# Patient Record
Sex: MALE | Race: WHITE | NOT HISPANIC OR LATINO | Employment: OTHER | URBAN - METROPOLITAN AREA
[De-identification: names, ages, dates, MRNs, and addresses within clinical notes are randomized per-mention and may not be internally consistent; named-entity substitution may affect disease eponyms.]

---

## 2023-03-03 LAB
GRAM STAIN: ABNORMAL
TISSUE/WOUND CULTURE/SMEAR: ABNORMAL
TISSUE/WOUND CULTURE/SMEAR: ABNORMAL

## 2023-03-06 ENCOUNTER — TELEPHONE (OUTPATIENT)
Dept: PRIMARY CARE | Facility: CLINIC | Age: 88
End: 2023-03-06
Payer: MEDICARE

## 2023-03-06 NOTE — TELEPHONE ENCOUNTER
Pt called to ask you a question about the cyst you drain last week.     Says he still has a hard lump where the cyst was...is this normal? Do you need to see his again for a re check?     He said he is healing fine, no redness or drainage, also he took his last antibiotic today.

## 2023-04-06 ENCOUNTER — TELEMEDICINE (OUTPATIENT)
Dept: PRIMARY CARE | Facility: CLINIC | Age: 88
End: 2023-04-06
Payer: MEDICARE

## 2023-04-06 DIAGNOSIS — E08.00 DIABETES MELLITUS DUE TO UNDERLYING CONDITION WITH HYPEROSMOLARITY WITHOUT COMA, WITHOUT LONG-TERM CURRENT USE OF INSULIN (MULTI): ICD-10-CM

## 2023-04-06 DIAGNOSIS — I48.91 ATRIAL FIBRILLATION WITH RVR (MULTI): Primary | ICD-10-CM

## 2023-04-06 PROBLEM — E66.9 CLASS 1 OBESITY WITH BODY MASS INDEX (BMI) OF 31.0 TO 31.9 IN ADULT: Status: ACTIVE | Noted: 2023-04-06

## 2023-04-06 PROBLEM — E66.811 CLASS 1 OBESITY WITH BODY MASS INDEX (BMI) OF 31.0 TO 31.9 IN ADULT: Status: ACTIVE | Noted: 2023-04-06

## 2023-04-06 PROBLEM — R51.9 OCCIPITAL PAIN: Status: ACTIVE | Noted: 2023-04-06

## 2023-04-06 PROBLEM — E11.9 DIABETES MELLITUS TYPE 2, CONTROLLED, WITHOUT COMPLICATIONS (MULTI): Status: ACTIVE | Noted: 2023-04-06

## 2023-04-06 PROBLEM — L02.214 GROIN ABSCESS: Status: ACTIVE | Noted: 2023-04-06

## 2023-04-06 PROBLEM — M48.061 LUMBAR CANAL STENOSIS: Status: ACTIVE | Noted: 2023-04-06

## 2023-04-06 PROBLEM — M79.602 PAIN OF LEFT UPPER EXTREMITY: Status: ACTIVE | Noted: 2023-04-06

## 2023-04-06 PROBLEM — R51.9 PAIN, HEAD: Status: ACTIVE | Noted: 2023-04-06

## 2023-04-06 PROBLEM — M43.10 ACQUIRED SPONDYLOLISTHESIS: Status: ACTIVE | Noted: 2023-04-06

## 2023-04-06 PROBLEM — M79.601 PAIN OF RIGHT UPPER EXTREMITY: Status: ACTIVE | Noted: 2023-04-06

## 2023-04-06 PROBLEM — I25.10 CORONARY ARTERIOSCLEROSIS: Status: ACTIVE | Noted: 2023-04-06

## 2023-04-06 PROBLEM — I10 HYPERTENSION: Status: ACTIVE | Noted: 2023-04-06

## 2023-04-06 PROBLEM — E78.5 HYPERLIPIDEMIA: Status: ACTIVE | Noted: 2023-04-06

## 2023-04-06 PROCEDURE — 99442 PR PHYS/QHP TELEPHONE EVALUATION 11-20 MIN: CPT | Performed by: FAMILY MEDICINE

## 2023-04-06 RX ORDER — VALSARTAN 320 MG/1
TABLET ORAL
COMMUNITY
Start: 2016-06-28 | End: 2024-04-05 | Stop reason: WASHOUT

## 2023-04-06 RX ORDER — ATENOLOL 25 MG/1
TABLET ORAL DAILY
COMMUNITY

## 2023-04-06 RX ORDER — ASPIRIN 81 MG/1
81 TABLET ORAL ONCE
COMMUNITY

## 2023-04-06 RX ORDER — ACETAMINOPHEN 500 MG
TABLET ORAL
COMMUNITY
End: 2024-04-05 | Stop reason: WASHOUT

## 2023-04-06 RX ORDER — INSULIN GLARGINE 100 [IU]/ML
40 INJECTION, SOLUTION SUBCUTANEOUS
COMMUNITY
Start: 2017-09-12

## 2023-04-06 RX ORDER — GARLIC 1000 MG
CAPSULE ORAL
COMMUNITY

## 2023-04-06 RX ORDER — LORATADINE 10 MG/1
CAPSULE, LIQUID FILLED ORAL
COMMUNITY

## 2023-04-06 RX ORDER — MULTIVITAMIN
TABLET ORAL
COMMUNITY

## 2023-04-06 RX ORDER — ASCORBIC ACID 500 MG
TABLET ORAL
COMMUNITY
End: 2024-04-05 | Stop reason: WASHOUT

## 2023-04-06 RX ORDER — NITROGLYCERIN 0.4 MG/1
TABLET SUBLINGUAL
COMMUNITY

## 2023-04-06 RX ORDER — FLUOXETINE HYDROCHLORIDE 40 MG/1
CAPSULE ORAL
COMMUNITY
End: 2024-04-05 | Stop reason: WASHOUT

## 2023-04-06 RX ORDER — LATANOPROST 50 UG/ML
SOLUTION/ DROPS OPHTHALMIC
COMMUNITY
Start: 2022-05-23

## 2023-04-06 RX ORDER — ISOSORBIDE MONONITRATE 60 MG/1
1 TABLET, EXTENDED RELEASE ORAL EVERY MORNING
COMMUNITY
Start: 2022-04-07

## 2023-04-06 RX ORDER — TRIAMCINOLONE ACETONIDE 1 MG/G
CREAM TOPICAL
COMMUNITY
Start: 2022-05-03

## 2023-04-06 RX ORDER — GUAIFENESIN/PHENYLPROPANOLAMIN
EXPECTORANT ORAL
COMMUNITY

## 2023-04-06 RX ORDER — IRBESARTAN 300 MG/1
1 TABLET ORAL
COMMUNITY
Start: 2022-07-11

## 2023-04-06 RX ORDER — SPIRONOLACTONE AND HYDROCHLOROTHIAZIDE 25; 25 MG/1; MG/1
1 TABLET ORAL DAILY
COMMUNITY
Start: 2017-01-09

## 2023-04-06 RX ORDER — SPIRONOLACTONE 25 MG/1
1 TABLET ORAL
COMMUNITY
Start: 2022-04-06 | End: 2024-04-05 | Stop reason: WASHOUT

## 2023-04-06 RX ORDER — AMLODIPINE BESYLATE 10 MG/1
TABLET ORAL DAILY
COMMUNITY

## 2023-04-06 RX ORDER — OMEGA-3/DHA/EPA/FISH OIL 300-1000MG
CAPSULE,DELAYED RELEASE (ENTERIC COATED) ORAL
COMMUNITY
End: 2024-04-05 | Stop reason: WASHOUT

## 2023-04-06 RX ORDER — FLUTICASONE PROPIONATE 50 MCG
2 SPRAY, SUSPENSION (ML) NASAL EVERY MORNING
COMMUNITY
Start: 2022-03-17

## 2023-04-06 RX ORDER — ATORVASTATIN CALCIUM 40 MG/1
40 TABLET, FILM COATED ORAL DAILY
COMMUNITY

## 2023-04-06 RX ORDER — SULFAMETHOXAZOLE AND TRIMETHOPRIM 800; 160 MG/1; MG/1
1 TABLET ORAL 2 TIMES DAILY
COMMUNITY
Start: 2023-02-27 | End: 2024-04-05 | Stop reason: WASHOUT

## 2023-04-06 RX ORDER — INSULIN HUMAN 100 [IU]/ML
30 INJECTION, SUSPENSION SUBCUTANEOUS
COMMUNITY
End: 2024-04-05 | Stop reason: WASHOUT

## 2023-04-06 RX ORDER — PANTOPRAZOLE SODIUM 40 MG/1
40 TABLET, DELAYED RELEASE ORAL
COMMUNITY
End: 2024-02-13

## 2023-04-06 RX ORDER — FUROSEMIDE 40 MG/1
1 TABLET ORAL
COMMUNITY
Start: 2021-08-15

## 2023-04-06 RX ORDER — GABAPENTIN 600 MG/1
1 TABLET ORAL 3 TIMES DAILY
COMMUNITY
Start: 2022-07-11

## 2023-04-06 RX ORDER — METOPROLOL SUCCINATE 50 MG/1
1 TABLET, EXTENDED RELEASE ORAL
COMMUNITY
Start: 2022-03-05

## 2023-04-06 NOTE — PROGRESS NOTES
Subjective   William Mckinnon is a 87 y.o. male who presents for No chief complaint on file..    [facility]  Hospital follow up   [place] in [place]   Started on 3/28/23 with Afib RVR with HR of 140   Admitted and did a cardiovertion   Is feeling a little better     Started on amiodarone and rivaroxaban   Stopped another one, irbesartan     Checking BP at home 120/60  HR is 55-77    Follow up with cardiologist tomorrow. Dr Neil   Saw endocrinologist yesterday , Dr Mohamud  with A1c 7.1%   - started on ozempic over the victoza     ROS was completed and all systems are negative with the exception of what was noted in the the HPI.     Objective     There were no vitals taken for this visit.     Physical Exam    Assessment/Plan   Problem List Items Addressed This Visit    None  Visit Diagnoses       Atrial fibrillation with RVR (CMS/HCC)    -  Primary    Diabetes mellitus due to underlying condition with hyperosmolarity without coma, without long-term current use of insulin (CMS/HCC)              Continue follow up with your specialists   We will see you as recommended.          Marlin Romo DO, MSMed, ABOM  [address]   [address]   [place] [address]  Ph. [phone]  Fx. [phone]

## 2023-04-10 ENCOUNTER — TELEMEDICINE (OUTPATIENT)
Dept: PRIMARY CARE | Facility: CLINIC | Age: 88
End: 2023-04-10
Payer: MEDICARE

## 2023-04-10 ENCOUNTER — TELEPHONE (OUTPATIENT)
Dept: PRIMARY CARE | Facility: CLINIC | Age: 88
End: 2023-04-10

## 2023-04-10 DIAGNOSIS — J01.10 ACUTE FRONTAL SINUSITIS, RECURRENCE NOT SPECIFIED: Primary | ICD-10-CM

## 2023-04-10 PROCEDURE — 99214 OFFICE O/P EST MOD 30 MIN: CPT | Performed by: FAMILY MEDICINE

## 2023-04-10 RX ORDER — DOXYCYCLINE 100 MG/1
100 CAPSULE ORAL 2 TIMES DAILY
Qty: 14 CAPSULE | Refills: 0 | Status: SHIPPED | OUTPATIENT
Start: 2023-04-10 | End: 2023-04-17

## 2023-04-10 RX ORDER — BENZONATATE 100 MG/1
100 CAPSULE ORAL 3 TIMES DAILY PRN
Qty: 42 CAPSULE | Refills: 0 | Status: SHIPPED | OUTPATIENT
Start: 2023-04-10 | End: 2023-05-10

## 2023-04-10 NOTE — TELEPHONE ENCOUNTER
William Pratibha calling because he and his wife had a virtual on Friday with Dr Romo.  At that visit his wife was sick  not him.  Dr Romo put her on antibiotics and she is already feeling better. William is now sick with a low grade fever and cough and was hoping Dr Romo could prescribe same antibiotic for him.  He also wants to know if you received the fax regarding his hospital admission?

## 2023-04-10 NOTE — PROGRESS NOTES
Subjective   William Mckinnon is a 87 y.o. male who presents for Sinusitis.    HPI  URI   Started on Saturday with a scratch throat and facial/ head congestion   Had this before and then doxycycline has worked in the past   Low grade fever in  and out   Chilled   No chest pains   Coughing and sneezing   Taking vitamin C and theraflu    ROS was completed and all systems are negative with the exception of what was noted in the the HPI.     Objective  deferred     There were no vitals taken for this visit.     Physical Exam deferred     Assessment/Plan   Problem List Items Addressed This Visit    None  Visit Diagnoses       Acute frontal sinusitis, recurrence not specified    -  Primary        Start the doxycycline and continue the theraflu   Try the benzonatate for the cough        Marlin Romo DO, MSMed, ABOM  [address]   [address]   [place] [address]  Ph. [phone]  Fx. [phone]

## 2023-04-17 LAB
ALANINE AMINOTRANSFERASE (SGPT) (U/L) IN SER/PLAS: 21 U/L (ref 10–52)
ALBUMIN (G/DL) IN SER/PLAS: 4.5 G/DL (ref 3.4–5)
ALKALINE PHOSPHATASE (U/L) IN SER/PLAS: 37 U/L (ref 33–136)
ANION GAP IN SER/PLAS: 13 MMOL/L (ref 10–20)
ASPARTATE AMINOTRANSFERASE (SGOT) (U/L) IN SER/PLAS: 31 U/L (ref 9–39)
BASOPHILS (10*3/UL) IN BLOOD BY AUTOMATED COUNT: 0.07 X10E9/L (ref 0–0.1)
BASOPHILS/100 LEUKOCYTES IN BLOOD BY AUTOMATED COUNT: 0.7 % (ref 0–2)
BILIRUBIN TOTAL (MG/DL) IN SER/PLAS: 0.7 MG/DL (ref 0–1.2)
CALCIUM (MG/DL) IN SER/PLAS: 9.4 MG/DL (ref 8.6–10.3)
CARBON DIOXIDE, TOTAL (MMOL/L) IN SER/PLAS: 28 MMOL/L (ref 21–32)
CHLORIDE (MMOL/L) IN SER/PLAS: 100 MMOL/L (ref 98–107)
CREATININE (MG/DL) IN SER/PLAS: 1.07 MG/DL (ref 0.5–1.3)
EOSINOPHILS (10*3/UL) IN BLOOD BY AUTOMATED COUNT: 0.23 X10E9/L (ref 0–0.4)
EOSINOPHILS/100 LEUKOCYTES IN BLOOD BY AUTOMATED COUNT: 2.4 % (ref 0–6)
ERYTHROCYTE DISTRIBUTION WIDTH (RATIO) BY AUTOMATED COUNT: 13 % (ref 11.5–14.5)
ERYTHROCYTE MEAN CORPUSCULAR HEMOGLOBIN CONCENTRATION (G/DL) BY AUTOMATED: 32.6 G/DL (ref 32–36)
ERYTHROCYTE MEAN CORPUSCULAR VOLUME (FL) BY AUTOMATED COUNT: 93 FL (ref 80–100)
ERYTHROCYTES (10*6/UL) IN BLOOD BY AUTOMATED COUNT: 4.21 X10E12/L (ref 4.5–5.9)
GFR MALE: 67 ML/MIN/1.73M2
GLUCOSE (MG/DL) IN SER/PLAS: 128 MG/DL (ref 74–99)
HEMATOCRIT (%) IN BLOOD BY AUTOMATED COUNT: 39 % (ref 41–52)
HEMOGLOBIN (G/DL) IN BLOOD: 12.7 G/DL (ref 13.5–17.5)
IMMATURE GRANULOCYTES/100 LEUKOCYTES IN BLOOD BY AUTOMATED COUNT: 0.8 % (ref 0–0.9)
LEUKOCYTES (10*3/UL) IN BLOOD BY AUTOMATED COUNT: 9.6 X10E9/L (ref 4.4–11.3)
LYMPHOCYTES (10*3/UL) IN BLOOD BY AUTOMATED COUNT: 2.55 X10E9/L (ref 0.8–3)
LYMPHOCYTES/100 LEUKOCYTES IN BLOOD BY AUTOMATED COUNT: 26.5 % (ref 13–44)
MAGNESIUM (MG/DL) IN SER/PLAS: 1.57 MG/DL (ref 1.6–2.4)
MONOCYTES (10*3/UL) IN BLOOD BY AUTOMATED COUNT: 0.78 X10E9/L (ref 0.05–0.8)
MONOCYTES/100 LEUKOCYTES IN BLOOD BY AUTOMATED COUNT: 8.1 % (ref 2–10)
NEUTROPHILS (10*3/UL) IN BLOOD BY AUTOMATED COUNT: 5.91 X10E9/L (ref 1.6–5.5)
NEUTROPHILS/100 LEUKOCYTES IN BLOOD BY AUTOMATED COUNT: 61.5 % (ref 40–80)
PLATELETS (10*3/UL) IN BLOOD AUTOMATED COUNT: 225 X10E9/L (ref 150–450)
POTASSIUM (MMOL/L) IN SER/PLAS: 4 MMOL/L (ref 3.5–5.3)
PROTEIN TOTAL: 7.7 G/DL (ref 6.4–8.2)
SODIUM (MMOL/L) IN SER/PLAS: 137 MMOL/L (ref 136–145)
THYROTROPIN (MIU/L) IN SER/PLAS BY DETECTION LIMIT <= 0.05 MIU/L: 1.7 MIU/L (ref 0.44–3.98)
UREA NITROGEN (MG/DL) IN SER/PLAS: 21 MG/DL (ref 6–23)

## 2023-07-26 ENCOUNTER — TELEPHONE (OUTPATIENT)
Dept: PRIMARY CARE | Facility: CLINIC | Age: 88
End: 2023-07-26
Payer: MEDICARE

## 2023-07-26 NOTE — TELEPHONE ENCOUNTER
Pt hurt shoulder last Thursday and it is not any better. He went to urgent care and they would not do X-Ray's or anything and told him to make an appointment with his PCP or get a referral from his PCP for ortho.

## 2023-07-31 ENCOUNTER — TELEPHONE (OUTPATIENT)
Dept: PRIMARY CARE | Facility: CLINIC | Age: 88
End: 2023-07-31
Payer: MEDICARE

## 2023-07-31 NOTE — TELEPHONE ENCOUNTER
Patient called back to follow up on shoulder pain. States it was the right shoulder and he's not exactly sure how he hurt it, just that after doing yard work the very next day he's had a lot of pain. States he saw Dr. Mckoy, was told the pain was muscular and they gave him a Cortizone shot, but it didn't help. States he is still in a tremendous amount of pain.  Would like to know if there is anything else that can be done.

## 2023-08-01 ENCOUNTER — OFFICE VISIT (OUTPATIENT)
Dept: PRIMARY CARE | Facility: CLINIC | Age: 88
End: 2023-08-01
Payer: MEDICARE

## 2023-08-01 VITALS — HEART RATE: 73 BPM | DIASTOLIC BLOOD PRESSURE: 80 MMHG | SYSTOLIC BLOOD PRESSURE: 126 MMHG | OXYGEN SATURATION: 97 %

## 2023-08-01 DIAGNOSIS — S46.912D STRAIN OF LEFT SHOULDER, SUBSEQUENT ENCOUNTER: Primary | ICD-10-CM

## 2023-08-01 DIAGNOSIS — M25.511 ACUTE PAIN OF RIGHT SHOULDER: Primary | ICD-10-CM

## 2023-08-01 PROCEDURE — 3074F SYST BP LT 130 MM HG: CPT | Performed by: FAMILY MEDICINE

## 2023-08-01 PROCEDURE — 99214 OFFICE O/P EST MOD 30 MIN: CPT | Performed by: FAMILY MEDICINE

## 2023-08-01 PROCEDURE — 1159F MED LIST DOCD IN RCRD: CPT | Performed by: FAMILY MEDICINE

## 2023-08-01 PROCEDURE — 3079F DIAST BP 80-89 MM HG: CPT | Performed by: FAMILY MEDICINE

## 2023-08-01 PROCEDURE — 1036F TOBACCO NON-USER: CPT | Performed by: FAMILY MEDICINE

## 2023-08-01 RX ORDER — METHOCARBAMOL 500 MG/1
500 TABLET, FILM COATED ORAL 3 TIMES DAILY PRN
Qty: 40 TABLET | Refills: 0 | Status: SHIPPED | OUTPATIENT
Start: 2023-08-01 | End: 2023-08-14 | Stop reason: SDUPTHER

## 2023-08-01 ASSESSMENT — PATIENT HEALTH QUESTIONNAIRE - PHQ9
1. LITTLE INTEREST OR PLEASURE IN DOING THINGS: NOT AT ALL
SUM OF ALL RESPONSES TO PHQ9 QUESTIONS 1 AND 2: 0
2. FEELING DOWN, DEPRESSED OR HOPELESS: NOT AT ALL

## 2023-08-01 NOTE — PROGRESS NOTES
Subjective   William Mckinnon is a 88 y.o. male who presents for Follow-up (Right shoulder pain x 2 weeks starting to get worse. Had xrays in February/Urgent care recent recommended PCP and Orthro).    HPI    #) right shoulder pain   - OA and xray in Feb  - but since then has had a fall on 7/7/23   - saw EDMOND Mckeon,  was told the pain was muscular and they gave him a Cortizone shot, but it didn't help.   - will also try robaxin   - and an order for PT     #) T2DM   - on insulin   - has a Junior   - last A1c 6.2%     #) Afib   [facility] in [place]   Started on 3/28/23 with Afib RVR with HR of 140   Admitted and did a cardiovertion   Is feeling a little better   Started on amiodarone and rivaroxaban   Stopped another one, irbesartan   Checking BP at home 120/60  HR is 55-77  Follow up with cardiologist , Dr Neil   Saw endocrinologist yesterday , Dr Mohamud  with A1c 7.1%   - started on ozempic over the victoza     ROS was completed and all systems are negative with the exception of what was noted in the the HPI.     Objective     /80   Pulse 73   SpO2 97%      Physical Exam  GEN: A+O, no acute distress  HEENT: NC/AT, Oropharynx clear, no exudates, TM visualized, Extraoccular muscles intact, no facial droop; no thyromegaly or cervical LAD  RESP: CTAB, no wheezes   CV: RRR, no murmurs  ABD: soft, non-tender, + BS  SKIN: no rashes or bruising, no peripheral edema   NEURO: CN II-XII grossly intact, moves all extremities equally, no tremor   PSYCH: normal affect, appropriate mood     Assessment/Plan     Please get xrays of the right shoulder  - we will call you with the results.   Follow up with Dr Mckoy   Also try the robaxin for muscle spasms  Also get assessment for physical therapy on the right shoulder.     Follow up as recommended          Marlin Romo, DO, MSMed, ABOM  [address]   [address]   [place] [address]  Ph. [phone]  Fx. [phone]

## 2023-08-01 NOTE — TELEPHONE ENCOUNTER
Spoke with patient's wife. States they have tried heat and cold, is taking extra strength tylenol but doesn't have any muscle relaxer's

## 2023-08-01 NOTE — PATIENT INSTRUCTIONS
Please get xrays of the right shoulder  - we will call you with the results.   Follow up with Dr Mckoy   Also try the robaxin for muscle spasms  Also get assessment for physical therapy on the right shoulder.     Follow up as recommended

## 2023-08-14 DIAGNOSIS — S46.912D STRAIN OF LEFT SHOULDER, SUBSEQUENT ENCOUNTER: ICD-10-CM

## 2023-08-15 RX ORDER — METHOCARBAMOL 500 MG/1
500 TABLET, FILM COATED ORAL 3 TIMES DAILY PRN
Qty: 40 TABLET | Refills: 1 | Status: SHIPPED | OUTPATIENT
Start: 2023-08-15 | End: 2023-09-18 | Stop reason: SDUPTHER

## 2023-08-18 ENCOUNTER — OFFICE VISIT (OUTPATIENT)
Dept: PRIMARY CARE | Facility: CLINIC | Age: 88
End: 2023-08-18
Payer: MEDICARE

## 2023-08-18 VITALS
BODY MASS INDEX: 31.38 KG/M2 | SYSTOLIC BLOOD PRESSURE: 138 MMHG | OXYGEN SATURATION: 96 % | HEART RATE: 78 BPM | WEIGHT: 225 LBS | DIASTOLIC BLOOD PRESSURE: 82 MMHG

## 2023-08-18 DIAGNOSIS — E08.00 DIABETES MELLITUS DUE TO UNDERLYING CONDITION WITH HYPEROSMOLARITY WITHOUT COMA, WITHOUT LONG-TERM CURRENT USE OF INSULIN (MULTI): ICD-10-CM

## 2023-08-18 DIAGNOSIS — R91.1 RIGHT LOWER LOBE PULMONARY NODULE: ICD-10-CM

## 2023-08-18 DIAGNOSIS — E78.2 MIXED HYPERLIPIDEMIA: ICD-10-CM

## 2023-08-18 DIAGNOSIS — M25.511 ACUTE PAIN OF RIGHT SHOULDER: ICD-10-CM

## 2023-08-18 DIAGNOSIS — Z00.00 ROUTINE GENERAL MEDICAL EXAMINATION AT HEALTH CARE FACILITY: Primary | ICD-10-CM

## 2023-08-18 LAB — HEMOGLOBIN A1C/HEMOGLOBIN TOTAL IN BLOOD EXTERNAL: 6.7 %

## 2023-08-18 PROCEDURE — 1159F MED LIST DOCD IN RCRD: CPT | Performed by: FAMILY MEDICINE

## 2023-08-18 PROCEDURE — 99214 OFFICE O/P EST MOD 30 MIN: CPT | Performed by: FAMILY MEDICINE

## 2023-08-18 PROCEDURE — 1160F RVW MEDS BY RX/DR IN RCRD: CPT | Performed by: FAMILY MEDICINE

## 2023-08-18 PROCEDURE — 1036F TOBACCO NON-USER: CPT | Performed by: FAMILY MEDICINE

## 2023-08-18 PROCEDURE — 3075F SYST BP GE 130 - 139MM HG: CPT | Performed by: FAMILY MEDICINE

## 2023-08-18 PROCEDURE — 1170F FXNL STATUS ASSESSED: CPT | Performed by: FAMILY MEDICINE

## 2023-08-18 PROCEDURE — 3079F DIAST BP 80-89 MM HG: CPT | Performed by: FAMILY MEDICINE

## 2023-08-18 RX ORDER — AMIODARONE HYDROCHLORIDE 200 MG/1
200 TABLET ORAL DAILY
COMMUNITY

## 2023-08-18 ASSESSMENT — ACTIVITIES OF DAILY LIVING (ADL)
BATHING: INDEPENDENT
TAKING_MEDICATION: INDEPENDENT
DRESSING: INDEPENDENT
DOING_HOUSEWORK: INDEPENDENT
GROCERY_SHOPPING: INDEPENDENT
MANAGING_FINANCES: INDEPENDENT

## 2023-08-18 NOTE — PATIENT INSTRUCTIONS
Jake will need to be [facility] [place] as they are the most up to date as to what immunizations, medications should be included when embarking on such a trip.  Please let him know this.  Either set up referral or give him the # for the clinic. missy   Please continue PT for the right shoulder ,    So glad you are feeling better after the bee stings.     Please get an updated CT chest after August 30, 2023, order is in.     Follow up with Dr Bowman for the diabetes, A1c was good last time at 6.7%.     Magnesium was low in April 2023. Please increase the magnesium in the diet ( potatoes, tomatoes, bananas, etc. )     [facility] - stable blood counts and kidney function, your triglycerides are still elevated, contienu the choelsterol medications and fish oils.     Continue with regular eye and dental examinations.     Follow up in 4 months or sooner as needed

## 2023-08-18 NOTE — PROGRESS NOTES
Subjective   Reason for Visit: William Mckinnon is an 88 y.o. male here for a Medicare Wellness visit.     Reviewed all medications by prescribing practitioner or clinical pharmacist (such as prescriptions, OTCs, herbal therapies and supplements) and documented in the medical record.    HPI    8/5/23 was sting by a bunch of bees   - healing now   - started on benadryl right away   - no need for steroids or epi    #) right shoulder pain - very good, doing much better with PT with Luc   - OA and xray in Feb  - pain down to 4-5/10   - but since then has had a fall on 7/7/23   - saw EDMOND Mckeon,  was told the pain was muscular and they gave him a Cortizone shot, but it didn't help.   - will also try robaxin   - and an order for PT     #) T2DM - controlled   - on insulin   - has a Junior   - last A1c 6.2% --> 6.7%   - follows with Dr Mohamud     #) Afib - stable   [facility] in [place]   Started on 3/28/23 with Afib RVR with HR of 140   Admitted and did a cardiovertion   Is feeling a little better   Is on amiodarone and rivaroxaban   Stopped another one, irbesartan   Checking BP at home 120/60  HR is 55-77  Follow up with cardiologist , Dr Neil   Saw endocrinologist yesterday , Dr Mohamud  with A1c 7.1%   - started on ozempic over the vicza     Patient Care Team:  Marlin Romo DO as PCP - General  Marlin Romo DO as PCP - [facility] Attributed Provider  Marlin Romo DO     Review of Systems  ROS was completed and all systems are negative with the exception of what was noted in the the HPI.     Objective   Vitals:  /82   Pulse 78   Wt 102 kg (225 lb)   SpO2 96%   BMI 31.38 kg/m²       Physical Exam  GEN: A+O, no acute distress  HEENT: NC/AT, Oropharynx clear, no exudates, TM visualized, Extraoccular muscles intact, no facial droop; no thyromegaly or cervical LAD  RESP: CTAB, no wheezes   CV: RRR, no murmurs  ABD: soft, non-tender, + BS  SKIN: no rashes or bruising, no peripheral edema   NEURO: CN II-XII  grossly intact, moves all extremities equally, no tremor   PSYCH: normal affect, appropriate mood     Assessment/Plan   Problem List Items Addressed This Visit    None  Visit Diagnoses       Right lower lobe pulmonary nodule    -  Primary    Relevant Orders    CT chest wo IV contrast    Routine general medical examination at health care facility              Please continue PT for the right shoulder ,    So glad you are feeling better after the bee stings.     Please get an updated CT chest after August 30, 2023, order is in.     Follow up with Dr Bowman for the diabetes, A1c was good last time at 6.7%.     Magnesium was low in April 2023. Please increase the magnesium in the diet ( potatoes, tomatoes, bananas, etc. )     [facility] - stable blood counts and kidney function, your triglycerides are still elevated, contienu the choelsterol medications and fish oils.     Continue with regular eye and dental examinations.     Follow up in 4 months or sooner as needed

## 2023-08-20 PROBLEM — I21.9 HEART ATTACK (MULTI): Status: ACTIVE | Noted: 2023-08-20

## 2023-08-20 PROBLEM — N28.9 KIDNEY LESION: Status: ACTIVE | Noted: 2018-05-10

## 2023-08-20 PROBLEM — S49.91XA INJURY OF SHOULDER, RIGHT: Status: ACTIVE | Noted: 2023-08-20

## 2023-08-20 PROBLEM — I25.10 ATHEROSCLEROSIS OF NATIVE CORONARY ARTERY OF NATIVE HEART WITHOUT ANGINA PECTORIS: Status: ACTIVE | Noted: 2023-08-20

## 2023-08-20 PROBLEM — T63.441A STING, BEE: Status: ACTIVE | Noted: 2023-08-20

## 2023-08-20 PROBLEM — D51.3 OTHER DIETARY VITAMIN B12 DEFICIENCY ANEMIA: Status: ACTIVE | Noted: 2018-06-07

## 2023-08-20 PROBLEM — D50.9 IRON DEFICIENCY ANEMIA: Status: ACTIVE | Noted: 2019-08-01

## 2023-08-20 PROBLEM — M43.16 SPONDYLOLISTHESIS, LUMBAR REGION: Status: ACTIVE | Noted: 2020-02-28

## 2023-08-20 PROBLEM — M48.062 LUMBAR STENOSIS WITH NEUROGENIC CLAUDICATION: Status: ACTIVE | Noted: 2019-08-30

## 2023-08-20 PROBLEM — R91.1 LUNG NODULE SEEN ON IMAGING STUDY: Status: ACTIVE | Noted: 2018-05-10

## 2023-08-20 RX ORDER — OMEGA-3 FATTY ACIDS 1000 MG
5000 CAPSULE ORAL DAILY
COMMUNITY

## 2023-08-20 RX ORDER — LANOLIN ALCOHOL/MO/W.PET/CERES
CREAM (GRAM) TOPICAL
COMMUNITY

## 2023-08-20 RX ORDER — FLUOCINONIDE 0.5 MG/G
1 CREAM TOPICAL DAILY PRN
COMMUNITY
Start: 2023-05-08

## 2023-08-20 RX ORDER — SAW PALMETTO 160 MG
CAPSULE ORAL
COMMUNITY
End: 2024-04-05 | Stop reason: WASHOUT

## 2023-08-20 RX ORDER — INSULIN ASPART 100 [IU]/ML
30 INJECTION, SOLUTION INTRAVENOUS; SUBCUTANEOUS
COMMUNITY

## 2023-08-20 RX ORDER — CLOPIDOGREL BISULFATE 75 MG/1
TABLET ORAL
COMMUNITY
Start: 2017-11-13

## 2023-08-20 RX ORDER — FERROUS SULFATE 325(65) MG
325 TABLET ORAL 2 TIMES DAILY
COMMUNITY
End: 2024-04-05 | Stop reason: WASHOUT

## 2023-08-20 RX ORDER — MOMETASONE FUROATE 50 UG/1
1 SPRAY, METERED NASAL
COMMUNITY

## 2023-08-20 RX ORDER — TRAMADOL HYDROCHLORIDE 50 MG/1
50 TABLET ORAL EVERY 6 HOURS
COMMUNITY
Start: 2023-07-25

## 2023-08-20 RX ORDER — SPIRONOLACTONE 50 MG/1
50 TABLET, FILM COATED ORAL DAILY
COMMUNITY
End: 2024-04-05 | Stop reason: WASHOUT

## 2023-08-20 RX ORDER — TERBINAFINE HYDROCHLORIDE 250 MG/1
1 TABLET ORAL DAILY
COMMUNITY
Start: 2020-12-14

## 2023-08-20 RX ORDER — FLUOXETINE HYDROCHLORIDE 20 MG/1
20 CAPSULE ORAL DAILY
COMMUNITY

## 2023-08-20 RX ORDER — INSULIN LISPRO 100 [IU]/ML
1 INJECTION, SOLUTION INTRAVENOUS; SUBCUTANEOUS 3 TIMES DAILY
COMMUNITY
End: 2024-04-05 | Stop reason: WASHOUT

## 2023-08-20 RX ORDER — FERROUS SULFATE 325(65) MG
65 TABLET, DELAYED RELEASE (ENTERIC COATED) ORAL DAILY
COMMUNITY
End: 2024-04-05 | Stop reason: WASHOUT

## 2023-08-20 RX ORDER — IBUPROFEN 100 MG/5ML
SUSPENSION, ORAL (FINAL DOSE FORM) ORAL
COMMUNITY
End: 2024-04-05 | Stop reason: WASHOUT

## 2023-08-20 RX ORDER — OMEPRAZOLE 20 MG/1
20 CAPSULE, DELAYED RELEASE ORAL
COMMUNITY

## 2023-08-20 RX ORDER — LOSARTAN POTASSIUM 50 MG/1
TABLET ORAL
COMMUNITY
Start: 2023-08-09

## 2023-08-20 RX ORDER — MAGNESIUM OXIDE 240 MG
POWDER IN PACKET (EA) ORAL
COMMUNITY

## 2023-08-20 RX ORDER — HYDROCODONE BITARTRATE AND ACETAMINOPHEN 7.5; 325 MG/1; MG/1
1 TABLET ORAL EVERY 4 HOURS PRN
COMMUNITY
Start: 2020-07-10 | End: 2024-04-05 | Stop reason: WASHOUT

## 2023-08-20 RX ORDER — DICLOFENAC SODIUM 75 MG/1
TABLET, DELAYED RELEASE ORAL
COMMUNITY
Start: 2017-04-27 | End: 2024-04-05 | Stop reason: WASHOUT

## 2023-08-20 RX ORDER — DIPHENHYDRAMINE HCL 25 MG
50 CAPSULE ORAL
COMMUNITY
Start: 2019-03-04 | End: 2024-04-05 | Stop reason: WASHOUT

## 2023-08-20 RX ORDER — BRIMONIDINE TARTRATE AND TIMOLOL MALEATE 2; 5 MG/ML; MG/ML
SOLUTION OPHTHALMIC
COMMUNITY
Start: 2020-02-09

## 2023-08-20 RX ORDER — METFORMIN HYDROCHLORIDE 1000 MG/1
1000 TABLET ORAL
COMMUNITY
End: 2024-04-05 | Stop reason: WASHOUT

## 2023-08-20 RX ORDER — METHOCARBAMOL 750 MG/1
750 TABLET, FILM COATED ORAL EVERY 8 HOURS PRN
COMMUNITY
Start: 2021-11-22 | End: 2024-04-05 | Stop reason: WASHOUT

## 2023-08-20 RX ORDER — INSULIN LISPRO 100 [IU]/ML
INJECTION, SOLUTION INTRAVENOUS; SUBCUTANEOUS
COMMUNITY
Start: 2018-06-25 | End: 2024-04-05 | Stop reason: WASHOUT

## 2023-08-20 RX ORDER — HYDROCODONE BITARTRATE AND ACETAMINOPHEN 7.5; 325 MG/1; MG/1
1 TABLET ORAL EVERY 6 HOURS
COMMUNITY
Start: 2018-02-04 | End: 2024-04-05 | Stop reason: WASHOUT

## 2023-08-20 RX ORDER — CYANOCOBALAMIN 1000 UG/ML
1000 INJECTION, SOLUTION INTRAMUSCULAR; SUBCUTANEOUS
COMMUNITY

## 2023-08-20 RX ORDER — FLASH GLUCOSE SENSOR
KIT MISCELLANEOUS
COMMUNITY
Start: 2023-03-14

## 2023-08-20 RX ORDER — AMLODIPINE BESYLATE 5 MG/1
10 TABLET ORAL
COMMUNITY
Start: 2014-01-21 | End: 2024-04-05 | Stop reason: WASHOUT

## 2023-08-20 RX ORDER — FUROSEMIDE 20 MG/1
20 TABLET ORAL
COMMUNITY
End: 2024-02-23 | Stop reason: WASHOUT

## 2023-08-20 RX ORDER — SPIRONOLACTONE 25 MG/5ML
25 SUSPENSION ORAL
COMMUNITY
End: 2024-04-05 | Stop reason: WASHOUT

## 2023-08-20 RX ORDER — GARLIC 580 MG
CAPSULE ORAL DAILY
COMMUNITY
End: 2024-04-05 | Stop reason: WASHOUT

## 2023-08-20 RX ORDER — GLIMEPIRIDE 4 MG/1
4 TABLET ORAL DAILY
COMMUNITY
End: 2024-04-05 | Stop reason: WASHOUT

## 2023-08-20 RX ORDER — VALSARTAN 160 MG/1
320 TABLET ORAL
COMMUNITY
Start: 2013-10-29 | End: 2024-04-05 | Stop reason: WASHOUT

## 2023-09-18 DIAGNOSIS — S46.912D STRAIN OF LEFT SHOULDER, SUBSEQUENT ENCOUNTER: ICD-10-CM

## 2023-09-18 RX ORDER — METHOCARBAMOL 500 MG/1
500 TABLET, FILM COATED ORAL 3 TIMES DAILY PRN
Qty: 40 TABLET | Refills: 1 | Status: SHIPPED | OUTPATIENT
Start: 2023-09-18 | End: 2023-11-03

## 2023-10-03 NOTE — PROGRESS NOTES
89 yo of Dr. Munguia practice with Diabetes 2 (neuropathy/retinopathy), HTN, Dyslipidemia, karli presents for followup. A1C today 7.3%. Pt is testing sugars 2 times per day. Pt is having low sugars 0 times/week. Pt's typical blood sugars are running under 140 AM under 200 before bed. Pt is following a carb controlled diet and knows reasonable carb allowances. Pt is able to afford their medications. Pt is exercising.         Taking lantus 40 (45 with cortisone) units, novolog 30 units, isf-30 over 150, victoza (wasn't able to get ozempic).          90 day surjit 2 data: 65% in range, 1% low, pattern: mid 100's overnight, mid/upper 100's through the day.  -STATIN Atorvastatin tolerating LDL          -on several bp/cv meds, see below         -taking amiodarone since spring 2023, should have tsh q 6 months  -[facility]    Review of Systems:  Cardiology: Lightheadedness-denies.  Chest pain-denies.  Leg edema-denies.  Palpitations-denies.  Respiratory: Cough-denies. Shortness of breath-denies.  Wheezing-denies.  Gastroenterology: Constipation-denies.  Diarrhea-denies.  Heartburn-denies.  Endocrinology: Cold intolerance-denies.  Heat intolerance-denies.  Sweats-denies.  Neurology: Headache-denies.  Tremor-denies.  Neuropathy in extremities-denies.  Psychology: Low energy-denies.  Irritability-denies.  Sleep disturbances-denies.      Physical Exam:  General Appearance: pleasant, cooperative, no acute distress  HEENT: no chemosis, no proptosis, no lid lag, no lid retraction  Neck: no lymphadenopathy, no thyromegaly, no dominant thyroid nodules  Heart: no murmurs, irregular rhythm, rate controlled, S1 and S2  Lungs: no wheezes, no rhonci, no rales  Extremities: no lower extremity swelling      Current Outpatient Medications:     acetaminophen (Tylenol) 500 mg tablet, Tylenol Extra Strength 500 MG TABS  Refills: 0     Active, Disp: , Rfl:     amLODIPine (Norvasc) 10 mg tablet, Take by mouth once daily., Disp:  , Rfl:     ascorbic acid (Vitamin C) 500 mg tablet, Vitamin C 500 MG Oral Tablet  Refills: 0     Active, Disp: , Rfl:     aspirin 81 mg EC tablet, 1 tablet (81 mg) 1 time., Disp: , Rfl:     atenolol (Tenormin) 25 mg tablet, Take by mouth once daily., Disp: , Rfl:     atorvastatin (Lipitor) 40 mg tablet, Take 1 tablet (40 mg) by mouth once daily., Disp: , Rfl:     blood sugar diagnostic strip, Per endo, Disp: , Rfl:     ferrous sulfate 325 (65 Fe) MG EC tablet, Take 1 tablet (65 mg) by mouth once daily., Disp: , Rfl:     ferrous sulfate 325 (65 Fe) MG tablet, Take 1 tablet (325 mg) by mouth twice a day., Disp: , Rfl:     fish oil concentrate (Omega-3) 120-180 mg capsule, Take 1 capsule (1 g) by mouth., Disp: , Rfl:     fluocinonide 0.05 % cream, Apply 1 Application topically once daily as needed for rash (itch)., Disp: , Rfl:     FLUoxetine (PROzac) 40 mg capsule, PROzac 40 MG Oral Capsule  Refills: 0     Active, Disp: , Rfl:     fluticasone (Flonase) 50 mcg/actuation nasal spray, Administer 2 sprays into each nostril once daily in the morning., Disp: , Rfl:     FreeStyle Junior 2 Sensor kit, USE 1 SENSOR AS DIRECTED EVERY 14 DAYS, E11.65 ON INSULIN, Disp: , Rfl:     furosemide (Lasix) 40 mg tablet, Take 1 tablet (40 mg) by mouth once every day., Disp: , Rfl:     gabapentin (Neurontin) 600 mg tablet, Take 1 tablet (600 mg) by mouth 3 times a day., Disp: , Rfl:     garlic 1,000 mg capsule, Garlic 1000 MG Oral Capsule  Refills: 0     Active, Disp: , Rfl:     insulin aspart (NovoLOG FlexPen U-100 Insulin) 100 unit/mL (3 mL) pen, Inject 30 Units under the skin 3 times a day before meals., Disp: , Rfl:     insulin glargine (Basaglar KwikPen U-100 Insulin) 100 unit/mL (3 mL) pen, 40 Units., Disp: , Rfl:     insulin lispro (HumaLOG KwikPen Insulin) 100 unit/mL injection, , Disp: , Rfl:     insulin lispro (HumaLOG) 100 unit/mL injection, Inject 1 Dose under the skin 3 times a day. Reports he takes 10 units breakfast, 12 units  with luch and 16 units with dinner, Disp: , Rfl:     irbesartan (Avapro) 300 mg tablet, Take 1 tablet (300 mg) by mouth once every day., Disp: , Rfl:     isosorbide mononitrate ER (Imdur) 60 mg 24 hr tablet, Take 1 tablet (60 mg) by mouth once daily in the morning., Disp: , Rfl:     latanoprost (Xalatan) 0.005 % ophthalmic solution, Latanoprost 0.005 % Ophthalmic Solution  Quantity: 8  Refills: 0      Start : 23-May-2022  Active, Disp: , Rfl:     liraglutide (Victoza) 0.6 mg/0.1 mL (18 mg/3 mL) injection, Inject 0.3 mL (1.8 mg) under the skin once daily., Disp: , Rfl:     loratadine (Claritin Liqui-Gel) 10 mg capsule, Claritin 10 MG Oral Capsule  Refills: 0     Active, Disp: , Rfl:     losartan (Cozaar) 50 mg tablet, , Disp: , Rfl:     methocarbamol (Robaxin) 500 mg tablet, Take 1 tablet (500 mg) by mouth 3 times a day as needed for muscle spasms., Disp: 40 tablet, Rfl: 1    metoprolol succinate XL (Toprol-XL) 50 mg 24 hr tablet, Take 1 tablet (50 mg) by mouth once every day., Disp: , Rfl:     mometasone (Nasonex) 50 mcg/actuation nasal spray, Administer 1 spray into affected nostril(s)., Disp: , Rfl:     multivitamin tablet, Multivitamins TABS  Refills: 0     Active, Disp: , Rfl:     nitroglycerin (Nitrostat) 0.4 mg SL tablet, Place 1 tablet under the tongue every 5 minutes for up to 3 doses as needed for chest pain. Call 911 if pain persists, Disp: , Rfl:     omega-3 1,000 mg capsule capsule, Take 5 capsules (5,000 mg) by mouth once daily., Disp: , Rfl:     omega-3 fatty acids-fish oil 440-880 mg capsule, Omega-3 Fish Oil Ex St CAPS  Refills: 0     Active, Disp: , Rfl:     oxygen (O2) therapy, Inhale 2 L/min at 120,000 mL/hr. AT NIGHT.  OXYGEN LIQUID, Disp: , Rfl:     pantoprazole (Protonix) 40 mg EC tablet, 1 tablet (40 mg) once daily in the morning. Take before meals., Disp: , Rfl:     rivaroxaban (Xarelto) 20 mg tablet, 1 tablet (20 mg) once daily at bedtime., Disp: , Rfl:     saw palmetto 500 mg capsule, Saw  Palmetto 500 MG Oral Capsule  Refills: 0     Active, Disp: , Rfl:     spironolacton-hydrochlorothiaz (Aldactazide) 25-25 mg tablet, Take 1 tablet (25 mg) by mouth once daily., Disp: , Rfl:     spironolactone (Aldactone) 25 mg tablet, Take 1 tablet (25 mg) by mouth once every day., Disp: , Rfl:     sulfamethoxazole-trimethoprim (Bactrim DS) 800-160 mg tablet, Take 1 tablet by mouth 2 times a day., Disp: , Rfl:     triamcinolone (Kenalog) 0.1 % cream, APPLY TO AFFECTED AREA ONCE DAILY AS NEEDED FOR RASH/ITCHING *30DS* *GIVE MD OFFICE A CALL*, Disp: , Rfl:     valsartan (Diovan) 320 mg tablet, Valsartan 320 MG Oral Tablet  Quantity: 90  Refills: 0      Start : 28-Jun-2016  Active, Disp: , Rfl:     amiodarone (Pacerone) 200 mg tablet, Take 1 tablet (200 mg) by mouth once daily., Disp: , Rfl:     amLODIPine (Norvasc) 5 mg tablet, 2 tablets (10 mg)., Disp: , Rfl:     ascorbic acid (Vitamin C) 1,000 mg tablet, Take by mouth., Disp: , Rfl:     brimonidine-timoloL (Combigan) 0.2-0.5 % ophthalmic solution, , Disp: , Rfl:     clopidogrel (Plavix) 75 mg tablet, , Disp: , Rfl:     cyanocobalamin (Vitamin B-12) 1,000 mcg/mL injection, Inject 1 mL (1,000 mcg) into the shoulder, thigh, or buttocks every 30 (thirty) days., Disp: , Rfl:     diclofenac (Voltaren) 75 mg EC tablet, , Disp: , Rfl:     diphenhydrAMINE (BENADryl) 25 mg capsule, Take 2 capsules (50 mg) by mouth. Take 1 hour before proceedure, Disp: , Rfl:     FLUoxetine (PROzac) 20 mg capsule, Take 1 capsule (20 mg) by mouth once daily., Disp: , Rfl:     furosemide (Lasix) 20 mg tablet, Take 1 tablet (20 mg) by mouth once daily., Disp: , Rfl:     garlic 580 mg capsule, Take by mouth once daily. As directed, Disp: , Rfl:     glimepiride (Amaryl) 4 mg tablet, Take 1 tablet (4 mg) by mouth once daily. With breakfast, Disp: , Rfl:     HYDROcodone-acetaminophen (Norco) 7.5-325 mg tablet, Take 1 tablet by mouth every 6 hours., Disp: , Rfl:     HYDROcodone-acetaminophen (Norco)  7.5-325 mg tablet, Take 1 tablet by mouth every 4 hours if needed for moderate pain (4 - 6). MAX DAILY AMOUNT 6 TABLETS, Disp: , Rfl:     insulin NPH, Isophane, (HumuLIN N NPH U-100 Insulin) 100 unit/mL injection, 30 Units 2 times a day before meals., Disp: , Rfl:     magnesium oxide (Mag-Ox) 400 mg (241.3 mg magnesium) tablet, Take by mouth., Disp: , Rfl:     magnesium oxide 240 mg magnesium powder in packet, Take by mouth., Disp: , Rfl:     meloxicam (Mobic) 7.5 mg tablet, Take 1 tablet (7.5 mg) by mouth once daily., Disp: , Rfl:     metFORMIN (Glucophage) 1,000 mg tablet, Take 1 tablet (1,000 mg) by mouth 2 times a day with meals., Disp: , Rfl:     methocarbamol (Robaxin) 750 mg tablet, Take 1 tablet (750 mg) by mouth every 8 hours if needed for muscle spasms., Disp: , Rfl:     NON FORMULARY, VELTRIX, Disp: , Rfl:     omeprazole (PriLOSEC) 20 mg DR capsule, Take 1 capsule (20 mg) by mouth once daily., Disp: , Rfl:     saw palmetto 160 mg capsule, Take by mouth., Disp: , Rfl:     SITagliptin phosphate (Januvia) 100 mg tablet, Take 1 tablet (100 mg) by mouth once daily., Disp: , Rfl:     spironolactone (Aldactone) 25 mg/5 mL suspension, Take 5 mL (25 mg) by mouth once daily., Disp: , Rfl:     spironolactone (Aldactone) 50 mg tablet, Take 1 tablet (50 mg) by mouth once daily., Disp: , Rfl:     terbinafine (LamISIL) 250 mg tablet, Take 1 tablet (250 mg) by mouth once daily., Disp: , Rfl:     traMADol (Ultram) 50 mg tablet, Take 1 tablet (50 mg) by mouth every 6 hours., Disp: , Rfl:     valsartan (Diovan) 160 mg tablet, 2 tablets (320 mg)., Disp: , Rfl:     vit B12/intrinsic fact/folate (INTRINSI B12-FOLATE ORAL), B12 Folate, Disp: , Rfl:        1. Diabetic polyneuropathy associated with type 2 diabetes mellitus (CMS/HCC)  -BS elevated recently due to cortisone injections, he misplaced his coverage scale it was reprinted  -reviewed target bs waking, post prandial and bedtime  -reviewed allowable carbs per  meal/snacks  -no dose changes with insulin therapy    - POCT glycosylated hemoglobin (Hb A1C) manually resulted    2. Atrial fibrillation, unspecified type (CMS/HCC)  -stable  -managed by cardiology    3. Mixed hyperlipidemia  -LDL target < 70  -tolerates statin    4. Primary hypertension  -stable     FOLLOW UP WITH ME IN 6 MONTHS

## 2023-10-04 ENCOUNTER — OFFICE VISIT (OUTPATIENT)
Dept: ENDOCRINOLOGY | Facility: CLINIC | Age: 88
End: 2023-10-04
Payer: MEDICARE

## 2023-10-04 VITALS
SYSTOLIC BLOOD PRESSURE: 139 MMHG | WEIGHT: 235.8 LBS | BODY MASS INDEX: 34.33 KG/M2 | DIASTOLIC BLOOD PRESSURE: 70 MMHG | HEART RATE: 63 BPM

## 2023-10-04 DIAGNOSIS — E11.42 DIABETIC POLYNEUROPATHY ASSOCIATED WITH TYPE 2 DIABETES MELLITUS (MULTI): Primary | ICD-10-CM

## 2023-10-04 DIAGNOSIS — E78.2 MIXED HYPERLIPIDEMIA: ICD-10-CM

## 2023-10-04 DIAGNOSIS — I10 PRIMARY HYPERTENSION: ICD-10-CM

## 2023-10-04 DIAGNOSIS — Z79.4 CONTROLLED TYPE 2 DIABETES MELLITUS WITHOUT COMPLICATION, WITH LONG-TERM CURRENT USE OF INSULIN (MULTI): ICD-10-CM

## 2023-10-04 DIAGNOSIS — I48.91 ATRIAL FIBRILLATION, UNSPECIFIED TYPE (MULTI): ICD-10-CM

## 2023-10-04 DIAGNOSIS — E11.9 CONTROLLED TYPE 2 DIABETES MELLITUS WITHOUT COMPLICATION, WITH LONG-TERM CURRENT USE OF INSULIN (MULTI): ICD-10-CM

## 2023-10-04 PROCEDURE — 1160F RVW MEDS BY RX/DR IN RCRD: CPT | Performed by: NURSE PRACTITIONER

## 2023-10-04 PROCEDURE — 1125F AMNT PAIN NOTED PAIN PRSNT: CPT | Performed by: NURSE PRACTITIONER

## 2023-10-04 PROCEDURE — 1036F TOBACCO NON-USER: CPT | Performed by: NURSE PRACTITIONER

## 2023-10-04 PROCEDURE — 3075F SYST BP GE 130 - 139MM HG: CPT | Performed by: NURSE PRACTITIONER

## 2023-10-04 PROCEDURE — 95251 CONT GLUC MNTR ANALYSIS I&R: CPT | Performed by: NURSE PRACTITIONER

## 2023-10-04 PROCEDURE — 3078F DIAST BP <80 MM HG: CPT | Performed by: NURSE PRACTITIONER

## 2023-10-04 PROCEDURE — 1159F MED LIST DOCD IN RCRD: CPT | Performed by: NURSE PRACTITIONER

## 2023-10-04 PROCEDURE — 99214 OFFICE O/P EST MOD 30 MIN: CPT | Performed by: NURSE PRACTITIONER

## 2023-10-04 RX ORDER — MELOXICAM 7.5 MG/1
7.5 TABLET ORAL DAILY
COMMUNITY
End: 2024-01-30 | Stop reason: SDUPTHER

## 2023-10-04 ASSESSMENT — PATIENT HEALTH QUESTIONNAIRE - PHQ9
SUM OF ALL RESPONSES TO PHQ9 QUESTIONS 1 & 2: 0
2. FEELING DOWN, DEPRESSED OR HOPELESS: NOT AT ALL
1. LITTLE INTEREST OR PLEASURE IN DOING THINGS: NOT AT ALL

## 2023-10-04 ASSESSMENT — PAIN SCALES - GENERAL: PAINLEVEL: 2

## 2023-10-04 NOTE — PATIENT INSTRUCTIONS
Blood Sugar................Units     151-180....................30 units   181-210....................31   211-240....................32   241-270....................33   271-300....................34   301-330....................35   331-360....................36   361-390....................37    HERE IS A NEW COPY OF YOUR PREVIOUS SCALE

## 2023-11-03 DIAGNOSIS — S46.912D STRAIN OF LEFT SHOULDER, SUBSEQUENT ENCOUNTER: ICD-10-CM

## 2023-11-03 RX ORDER — METHOCARBAMOL 500 MG/1
500 TABLET, FILM COATED ORAL 3 TIMES DAILY PRN
Qty: 40 TABLET | Refills: 1 | Status: SHIPPED | OUTPATIENT
Start: 2023-11-03 | End: 2023-12-21

## 2023-11-14 ENCOUNTER — OFFICE VISIT (OUTPATIENT)
Dept: ORTHOPEDIC SURGERY | Facility: CLINIC | Age: 88
End: 2023-11-14
Payer: MEDICARE

## 2023-11-14 DIAGNOSIS — M17.12 ARTHRITIS OF LEFT KNEE: Primary | ICD-10-CM

## 2023-11-14 DIAGNOSIS — M17.12 PRIMARY OSTEOARTHRITIS OF LEFT KNEE: Primary | ICD-10-CM

## 2023-11-14 PROCEDURE — 1125F AMNT PAIN NOTED PAIN PRSNT: CPT | Performed by: ORTHOPAEDIC SURGERY

## 2023-11-14 PROCEDURE — 3075F SYST BP GE 130 - 139MM HG: CPT | Performed by: ORTHOPAEDIC SURGERY

## 2023-11-14 PROCEDURE — 3078F DIAST BP <80 MM HG: CPT | Performed by: ORTHOPAEDIC SURGERY

## 2023-11-14 PROCEDURE — 1159F MED LIST DOCD IN RCRD: CPT | Performed by: ORTHOPAEDIC SURGERY

## 2023-11-14 PROCEDURE — 99213 OFFICE O/P EST LOW 20 MIN: CPT | Performed by: ORTHOPAEDIC SURGERY

## 2023-11-14 PROCEDURE — 1160F RVW MEDS BY RX/DR IN RCRD: CPT | Performed by: ORTHOPAEDIC SURGERY

## 2023-11-14 PROCEDURE — 1036F TOBACCO NON-USER: CPT | Performed by: ORTHOPAEDIC SURGERY

## 2023-11-14 ASSESSMENT — PAIN - FUNCTIONAL ASSESSMENT: PAIN_FUNCTIONAL_ASSESSMENT: 0-10

## 2023-11-14 ASSESSMENT — PAIN SCALES - GENERAL: PAINLEVEL_OUTOF10: 7

## 2023-11-14 NOTE — PROGRESS NOTES
This is a consultation from Dr. Marlin Romo DO for   Chief Complaint   Patient presents with    Left Knee - Edema, Pain, Follow-up       This is a 88 y.o. male who presents for follow-up for his left knee, patient has left knee arthritis had a cortisone shot about 6 weeks ago.  Gave him relief but it did not last very long.  Since then has had return of his symptoms.  Complains of sharp stabbing pain over the medial knee worse with walking.  He has instability in the knee as well.    Physical Exam    There has been no interval change in this patient's past medical, surgical, medications, allergies, family history or social history since the most recent visit to a provider within our department. 14 point review of systems was performed, reviewed, and negative except for pertinent positives documented in the history of present illness.     Constitutional: well developed, well nourished male in no acute distress  Psychiatric: normal mood, appropriate affect  Eyes: sclera anicteric  HENT: normocephalic/atraumatic  CV: regular rate and rhythm   Respiratory: non labored breathing  Integumentary: no rash  Neurological: moves all extremities    Left knee exam: skin intact no lacerations or abrations.  1+ effusion.  Tender medial joint line. negative log roll negative patellar grind. ROM 0-120. stable to varus and valgus stress at 0 and 30 degrees. negative lachman negative posterior drawer negative yadi. 5/5 ehl/fhl/gs/ta. silt s/s/sp/dp/t. 2+ dp/pt        Xrays were ordered by me, they were reviewed and independently interpreted by me today, they show severe degenerative disease bone-on-bone arthritis    Procedures      Impression/Plan: This is a 88 y.o. male with severe left knee arthritis.  I had an in depth discussion with the patient regarding treatment options for arthritis and their relative risks and benefits. We reviewed surgical and nonsurgical option for treatment. Treatments include anti inflammatory  "medications, physical therapy, weight loss, activity modification, use of assistive devices, injection therapies. We discussed current prescriptions and risks and benefits of continuation of prescription medication as apporpriate. We discussed that arthritis is often progressive over time, an in end stage arthritis surgical interventions can be considered, including arthroplasty. All questions were answered and the patient voiced their understanding.  Cortisone shot has not worked very well for him we will get him set up with gel I will see him back on its available    BMI Readings from Last 1 Encounters:   10/04/23 34.33 kg/m²      Lab Results   Component Value Date    CREATININE 1.07 04/17/2023     Tobacco Use: Medium Risk (11/14/2023)    Patient History     Smoking Tobacco Use: Former     Smokeless Tobacco Use: Never     Passive Exposure: Not on file      Computed MELD 3.0 unavailable. Necessary lab results were not found in the last year.  Computed MELD-Na unavailable. Necessary lab results were not found in the last year.       Lab Results   Component Value Date    HGBA1C 6.7 07/13/2023     No results found for: \"STAPHMRSASCR\"  "

## 2023-11-15 RX ORDER — HYALURONATE SODIUM, STABILIZED 60 MG/3 ML
60 SYRINGE (ML) INTRAARTICULAR ONCE
Qty: 3 ML | Refills: 0 | Status: SHIPPED | OUTPATIENT
Start: 2023-11-15 | End: 2023-11-15

## 2023-11-16 ENCOUNTER — SPECIALTY PHARMACY (OUTPATIENT)
Dept: PHARMACY | Facility: CLINIC | Age: 88
End: 2023-11-16

## 2023-12-14 ENCOUNTER — OFFICE VISIT (OUTPATIENT)
Dept: PRIMARY CARE | Facility: CLINIC | Age: 88
End: 2023-12-14
Payer: MEDICARE

## 2023-12-14 VITALS
OXYGEN SATURATION: 96 % | HEART RATE: 82 BPM | HEIGHT: 69 IN | SYSTOLIC BLOOD PRESSURE: 120 MMHG | BODY MASS INDEX: 35.55 KG/M2 | WEIGHT: 240 LBS | DIASTOLIC BLOOD PRESSURE: 72 MMHG

## 2023-12-14 DIAGNOSIS — I50.32 CHRONIC DIASTOLIC CONGESTIVE HEART FAILURE (MULTI): ICD-10-CM

## 2023-12-14 DIAGNOSIS — J41.8 MIXED SIMPLE AND MUCOPURULENT CHRONIC BRONCHITIS (MULTI): ICD-10-CM

## 2023-12-14 DIAGNOSIS — I48.91 ATRIAL FIBRILLATION WITH RVR (MULTI): Primary | ICD-10-CM

## 2023-12-14 DIAGNOSIS — E11.9 CONTROLLED TYPE 2 DIABETES MELLITUS WITHOUT COMPLICATION, WITHOUT LONG-TERM CURRENT USE OF INSULIN (MULTI): ICD-10-CM

## 2023-12-14 DIAGNOSIS — I70.0 ATHEROSCLEROSIS OF AORTA (CMS-HCC): ICD-10-CM

## 2023-12-14 DIAGNOSIS — R26.89 BALANCE DISORDER: ICD-10-CM

## 2023-12-14 DIAGNOSIS — E66.01 OBESITY, MORBID (MULTI): ICD-10-CM

## 2023-12-14 PROCEDURE — 1036F TOBACCO NON-USER: CPT | Performed by: FAMILY MEDICINE

## 2023-12-14 PROCEDURE — 3074F SYST BP LT 130 MM HG: CPT | Performed by: FAMILY MEDICINE

## 2023-12-14 PROCEDURE — 1125F AMNT PAIN NOTED PAIN PRSNT: CPT | Performed by: FAMILY MEDICINE

## 2023-12-14 PROCEDURE — 99214 OFFICE O/P EST MOD 30 MIN: CPT | Performed by: FAMILY MEDICINE

## 2023-12-14 PROCEDURE — 1160F RVW MEDS BY RX/DR IN RCRD: CPT | Performed by: FAMILY MEDICINE

## 2023-12-14 PROCEDURE — 1159F MED LIST DOCD IN RCRD: CPT | Performed by: FAMILY MEDICINE

## 2023-12-14 PROCEDURE — 3078F DIAST BP <80 MM HG: CPT | Performed by: FAMILY MEDICINE

## 2023-12-14 NOTE — PATIENT INSTRUCTIONS
Please call around and see about dong some vestibular rehab for the balance concerns.     Monitor the left shoulder pain, try to do some of the exercises you learned for the right shoulder     Follow up with Dr Dominguez tomorrow for the gel and steroid injections.     Updated CT chest looked good.     Follow up with Dr Bowman for the diabetes, A1c was good last time at 6.7%.   Continue to monitor you home blood sugars.     Magnesium was low in April 2023. Please increase the magnesium in the diet ( potatoes, tomatoes, bananas, etc. )     [facility] - [facility] blood counts and kidney function, your triglycerides are still elevated, contienu the choelsterol medications and fish oils.     Try to get me any additional [facility].     Continue with regular eye and dental examinations.     Follow up in 4 months or sooner as needed

## 2023-12-14 NOTE — PROGRESS NOTES
"Subjective   Reason for Visit: William Mckinnon is an 88 y.o. male here for a Medicare Wellness visit.     Reviewed all medications by prescribing practitioner or clinical pharmacist (such as prescriptions, OTCs, herbal therapies and supplements) and documented in the medical record.    HPI  Wears hearing aids    8/5/23 was sting by a bunch of bees   - healing now   - started on benadryl right away   - no need for steroids or epi    #) right shoulder pain - very good, doing much better with PT with Luc   - OA and xray in Feb  - pain down to 4-5/10   - but since then has had a fall on 7/7/23   - saw EDMOND Mckeon,  was told the pain was muscular and they gave him a Cortizone shot, but it didn't help.   - will also try robaxin   - and an order for PT     #) T2DM - controlled   - on insulin   - has a Junior   - last A1c 6.2% --> 6.7% on 7/13/23  - follows with Dr Mohamud     #) Afib - stable   [place]ot in [place]   Started on 3/28/23 with Afib RVR with HR of 140   Admitted and did a cardiovertion   Is feeling a little better   Is on amiodarone and rivaroxaban   Stopped another one, irbesartan   Checking BP at home 120/60  HR is 55-77  Follow up with cardiologist , Dr Neil   Saw endocrinologist yesterday , Dr Mohamud  with A1c 7.1%   - started on ozempic over the victoza     Patient Care Team:  Marlin Romo DO as PCP - General (Family Medicine)  Marlin Romo DO as PCP [facility] Attributed Provider  Marlin Romo DO     Review of Systems  ROS was completed and all systems are negative with the exception of what was noted in the the HPI.     Objective   Vitals:  /72   Pulse 82   Ht 1.753 m (5' 9\")   Wt 109 kg (240 lb)   SpO2 96%   BMI 35.44 kg/m²       Physical Exam  GEN: A+O, no acute distress  HEENT: NC/AT, Oropharynx clear, no exudates, TM visualized, Extraoccular muscles intact, no facial droop; no thyromegaly or cervical LAD  RESP: CTAB, no wheezes   CV: RRR, no murmurs  ABD: soft, non-tender, + " BS  SKIN: no rashes or bruising, no peripheral edema   NEURO: CN II-XII grossly intact, moves all extremities equally, no tremor   PSYCH: normal affect, appropriate mood     Assessment/Plan   Problem List Items Addressed This Visit    None    Please call around and see about dong some vestibular rehab for the balance concerns.     Monitor the left shoulder pain, try to do some of the exercises you learned for the right shoulder     Follow up with Dr Dominguez tomorrow for the gel and steroid injections.     Updated CT chest looked good.     Follow up with Dr Bowman for the diabetes, A1c was good last time at 6.7%.   Continue to monitor you home blood sugars.     Magnesium was low in April 2023. Please increase the magnesium in the diet ( potatoes, tomatoes, bananas, etc. )     [facility] - stable blood counts and kidney function, your triglycerides are still elevated, contienu the choelsterol medications and fish oils.     Try to get me any additional [facility] labs.     Continue with regular eye and dental examinations.     Follow up in 4 months or sooner as needed

## 2023-12-15 ENCOUNTER — OFFICE VISIT (OUTPATIENT)
Dept: ORTHOPEDIC SURGERY | Facility: CLINIC | Age: 88
End: 2023-12-15
Payer: MEDICARE

## 2023-12-15 DIAGNOSIS — M17.12 PRIMARY OSTEOARTHRITIS OF LEFT KNEE: Primary | ICD-10-CM

## 2023-12-15 PROCEDURE — 20610 DRAIN/INJ JOINT/BURSA W/O US: CPT | Performed by: ORTHOPAEDIC SURGERY

## 2023-12-15 PROCEDURE — 1125F AMNT PAIN NOTED PAIN PRSNT: CPT | Performed by: ORTHOPAEDIC SURGERY

## 2023-12-15 PROCEDURE — 99024 POSTOP FOLLOW-UP VISIT: CPT | Performed by: ORTHOPAEDIC SURGERY

## 2023-12-15 PROCEDURE — 1160F RVW MEDS BY RX/DR IN RCRD: CPT | Performed by: ORTHOPAEDIC SURGERY

## 2023-12-15 PROCEDURE — 1159F MED LIST DOCD IN RCRD: CPT | Performed by: ORTHOPAEDIC SURGERY

## 2023-12-15 PROCEDURE — 1036F TOBACCO NON-USER: CPT | Performed by: ORTHOPAEDIC SURGERY

## 2023-12-15 NOTE — PROGRESS NOTES
William Mckinnon is a 88 y.o. male here for gel injection  Chief Complaint   Patient presents with    Left Knee - Pain     LEFT KNEE ROBY FOSTER Inj/Asp: L knee on 12/15/2023 10:54 AM  Indications: pain and joint swelling  Details: 22 G needle, anterolateral approach  Medications: 60 mg sodium hyaluronate 60 mg/3 mL    Discussion:  I discussed the conservative treatment options for knee osteoarthritis including but not limited to physical therapy, oral NSAIDS, activity and lifestyle modification, hyaluronic acid injections and corticosteroid injections. Pt has elected to undergo a hyaluronic acid injection today. I have explained the risk and benefits of an injection including the possibility of joint infection, bleeding, damage to cartilage, allergic reaction. Patient verbalized understanding and gave verbal consent wishes to proceed with a intra-articular hyaluronic acid injection for their knee.    Procedure:  After discussing the risk and benefits of the procedure, we proceeded with an intra-articular left knee injection. We discussed the risks and benefits and potential morbidity related to the treatment, and to the prescription medication administered in the injection    With the patient's informed verbal consent, the left knee were prepped in standard sterile fashion with Chlorhexidine. The skin was then anesthetized with ethyl chloride spray and cleaned again with Chlorhexidine. The left knee was then apirated/injected with a prefilled 20-gauge syringe of 3ml/60mg Durolane using the lateral approach without complications.  The patient tolerated this well.  A bandaid was applied and the patient ambulated out of the clinic on ther own accord without difficulty. Patient was instructed to avoid physical activity for 24-48 hours to prevent the knees from swelling and may ice the knee as tolerated. Patient should contact the office if any signs of of infection appear: redness, fever, chills,  drainage, swelling or warmth to the knee.        Procedure, treatment alternatives, risks and benefits explained, specific risks discussed. Consent was given by the patient. Immediately prior to procedure a time out was called to verify the correct patient, procedure, equipment, support staff and site/side marked as required. Patient was prepped and draped in the usual sterile fashion.

## 2023-12-19 DIAGNOSIS — S46.912D STRAIN OF LEFT SHOULDER, SUBSEQUENT ENCOUNTER: ICD-10-CM

## 2023-12-21 RX ORDER — METHOCARBAMOL 500 MG/1
500 TABLET, FILM COATED ORAL 3 TIMES DAILY PRN
Qty: 40 TABLET | Refills: 1 | Status: SHIPPED | OUTPATIENT
Start: 2023-12-21 | End: 2024-04-16

## 2023-12-28 PROBLEM — I70.0 ATHEROSCLEROSIS OF AORTA (CMS-HCC): Status: ACTIVE | Noted: 2023-12-28

## 2023-12-28 PROBLEM — J41.8 MIXED SIMPLE AND MUCOPURULENT CHRONIC BRONCHITIS (MULTI): Status: ACTIVE | Noted: 2023-12-28

## 2023-12-28 PROBLEM — E66.01 OBESITY, MORBID (MULTI): Status: ACTIVE | Noted: 2023-12-28

## 2024-01-30 DIAGNOSIS — M25.511 ACUTE PAIN OF RIGHT SHOULDER: Primary | ICD-10-CM

## 2024-01-31 RX ORDER — MELOXICAM 7.5 MG/1
7.5 TABLET ORAL DAILY
Qty: 90 TABLET | Refills: 3 | Status: SHIPPED | OUTPATIENT
Start: 2024-01-31 | End: 2025-01-30

## 2024-02-10 DIAGNOSIS — Z00.00 ENCOUNTER FOR GENERAL ADULT MEDICAL EXAMINATION WITHOUT ABNORMAL FINDINGS: ICD-10-CM

## 2024-02-13 RX ORDER — PANTOPRAZOLE SODIUM 40 MG/1
40 TABLET, DELAYED RELEASE ORAL DAILY
Qty: 90 TABLET | Refills: 3 | Status: SHIPPED | OUTPATIENT
Start: 2024-02-13

## 2024-02-23 ENCOUNTER — OFFICE VISIT (OUTPATIENT)
Dept: PRIMARY CARE | Facility: CLINIC | Age: 89
End: 2024-02-23
Payer: MEDICARE

## 2024-02-23 VITALS
OXYGEN SATURATION: 99 % | BODY MASS INDEX: 34.7 KG/M2 | HEART RATE: 80 BPM | DIASTOLIC BLOOD PRESSURE: 76 MMHG | SYSTOLIC BLOOD PRESSURE: 138 MMHG | WEIGHT: 235 LBS

## 2024-02-23 DIAGNOSIS — R26.89 BALANCE DISORDER: ICD-10-CM

## 2024-02-23 DIAGNOSIS — E11.9 CONTROLLED TYPE 2 DIABETES MELLITUS WITHOUT COMPLICATION, WITHOUT LONG-TERM CURRENT USE OF INSULIN (MULTI): Primary | ICD-10-CM

## 2024-02-23 DIAGNOSIS — R31.29 OTHER MICROSCOPIC HEMATURIA: ICD-10-CM

## 2024-02-23 DIAGNOSIS — R10.9 ACUTE RIGHT FLANK PAIN: ICD-10-CM

## 2024-02-23 DIAGNOSIS — R82.90 FOUL SMELLING URINE: ICD-10-CM

## 2024-02-23 LAB
POC APPEARANCE, URINE: CLEAR
POC BILIRUBIN, URINE: NEGATIVE
POC BLOOD, URINE: ABNORMAL
POC COLOR, URINE: YELLOW
POC GLUCOSE, URINE: NEGATIVE MG/DL
POC HEMOGLOBIN A1C: 7.3 % (ref 4.2–6.5)
POC KETONES, URINE: NEGATIVE MG/DL
POC LEUKOCYTES, URINE: NEGATIVE
POC NITRITE,URINE: NEGATIVE
POC PH, URINE: 5.5 PH
POC PROTEIN, URINE: NEGATIVE MG/DL
POC SPECIFIC GRAVITY, URINE: 1.01
POC UROBILINOGEN, URINE: 0.2 EU/DL

## 2024-02-23 PROCEDURE — 1125F AMNT PAIN NOTED PAIN PRSNT: CPT | Performed by: FAMILY MEDICINE

## 2024-02-23 PROCEDURE — 1036F TOBACCO NON-USER: CPT | Performed by: FAMILY MEDICINE

## 2024-02-23 PROCEDURE — 3078F DIAST BP <80 MM HG: CPT | Performed by: FAMILY MEDICINE

## 2024-02-23 PROCEDURE — 81003 URINALYSIS AUTO W/O SCOPE: CPT | Performed by: FAMILY MEDICINE

## 2024-02-23 PROCEDURE — 1159F MED LIST DOCD IN RCRD: CPT | Performed by: FAMILY MEDICINE

## 2024-02-23 PROCEDURE — 83036 HEMOGLOBIN GLYCOSYLATED A1C: CPT | Performed by: FAMILY MEDICINE

## 2024-02-23 PROCEDURE — 3075F SYST BP GE 130 - 139MM HG: CPT | Performed by: FAMILY MEDICINE

## 2024-02-23 PROCEDURE — 99214 OFFICE O/P EST MOD 30 MIN: CPT | Performed by: FAMILY MEDICINE

## 2024-02-23 PROCEDURE — 1160F RVW MEDS BY RX/DR IN RCRD: CPT | Performed by: FAMILY MEDICINE

## 2024-02-23 ASSESSMENT — PATIENT HEALTH QUESTIONNAIRE - PHQ9
SUM OF ALL RESPONSES TO PHQ9 QUESTIONS 1 AND 2: 0
1. LITTLE INTEREST OR PLEASURE IN DOING THINGS: NOT AT ALL
2. FEELING DOWN, DEPRESSED OR HOPELESS: NOT AT ALL

## 2024-02-23 NOTE — PATIENT INSTRUCTIONS
Please call around and see about dong some vestibular rehab for the balance concerns.   Rehab was reordered     The urine showed some blood in the urine.   We can recheck your kidney function in the blood     Also order for a CT of the abdomen to screen for kidney stones     Your A1c was 7.3% today, please keep taking the current medications as you are for sugar.     Monitor the left shoulder pain, try to do some of the exercises you learned for the right shoulder     Follow up with Dr Dominguez for the gel and steroid injections.     Continue to monitor you home blood sugars.     Magnesium was low in April 2023. Please increase the magnesium in the diet ( potatoes, tomatoes, bananas, etc. )     [facility] - stable blood counts and kidney function, your triglycerides are still elevated, contienu the choelsterol medications and fish oils.     Continue with regular eye and dental examinations.     Follow up in 4 months  for a medicare wellness examination or sooner as needed

## 2024-02-23 NOTE — PROGRESS NOTES
Subjective   Reason for Visit: William Mckinnon is an 88 y.o. male here for a Medicare Wellness visit.     Reviewed all medications by prescribing practitioner or clinical pharmacist (such as prescriptions, OTCs, herbal therapies and supplements) and documented in the medical record.    HPI  Wears hearing aids      Reports change in urine odor for the last few months  - is darker   - UA show microscopic blood - no h/o kidney stones  - has had more pain in the right sacral area   - does seem better today   - was hit in the side with a baseball (early 20s) and was in the hospital since he was peeing blood   - CMP was good on 4/17/23, GFR 76 and Creatitnei 1.07     #) 8/5/23 was sting by a bunch of bees   - healing now   - started on benadryl right away   - no need for steroids or epi    #) right shoulder pain - very good, doing much better with PT with Luc   - OA and xray in Feb  - pain down to 4-5/10   - but since then has had a fall on 7/7/23   - saw Dr. [place]ckoy [place],  was told the pain was muscular and they gave him a Cortizone shot, but it didn't help.   - will also try robaxin   - and an order for PT     #) T2DM - controlled   - on  Novolog 31 and 1.8 victoza every AM , 25 Units at lunch, then 32 with diner and then Lantus 40 units at bedtime  - has a Junior - readings have been higher recently   - last A1c 6.2% --> 6.7% on 7/13/23--> 7.3% 2/23/24   - follows with Dr Mohamud - last seen on October 2023     #) Afib - stable   Hammot in [place]   Started on 3/28/23 with Afib RVR with HR of 140   Admitted and did a cardiovertion   Is feeling a little better   Is on amiodarone and rivaroxaban   Stopped another one, irbesartan   Checking BP at home 120/60  HR is 55-77  Follow up with cardiologist , Dr Neil   Saw endocrinologist yesterday , Dr Mohamud  with A1c 7.1%   - started on ozempic over the victoza     Patient Care Team:  Marlin Romo DO as PCP - General (Family Medicine)  [facility]  Attributed Provider  Marlin Romo DO     Review of Systems  ROS was completed and all systems are negative with the exception of what was noted in the the HPI.     Objective   Vitals:  /76   Pulse 80   Wt 107 kg (235 lb)   SpO2 99%   BMI 34.70 kg/m²       Physical Exam  GEN: A+O, no acute distress  HEENT: NC/AT, Oropharynx clear, no exudates, TM visualized, Extraoccular muscles intact, no facial droop; no thyromegaly or cervical LAD  RESP: CTAB, no wheezes   CV: RRR, no murmurs  ABD: soft, non-tender, + BS  SKIN: no rashes or bruising, no peripheral edema   NEURO: CN II-XII grossly intact, moves all extremities equally, no tremor   PSYCH: normal affect, appropriate mood     Assessment/Plan   Problem List Items Addressed This Visit       Diabetes mellitus type 2, controlled, without complications (CMS/HCC) - Primary    Relevant Orders    POCT glycosylated hemoglobin (Hb A1C) manually resulted (Completed)    Comprehensive metabolic panel     Other Visit Diagnoses       Foul smelling urine        Relevant Orders    POCT UA Automated manually resulted (Completed)    Comprehensive metabolic panel    Other microscopic hematuria        Relevant Orders    Comprehensive metabolic panel    CT abdomen pelvis wo IV contrast    Balance disorder        Relevant Orders    Referral to Physical Therapy    Acute right flank pain        Relevant Orders    CT abdomen pelvis wo IV contrast          Please call around and see about dong some vestibular rehab for the balance concerns.   Rehab was reordered     The urine showed some blood in the urine.   We can recheck your kidney function in the blood     Also order for a CT of the abdomen to screen for kidney stones     Your A1c was 7.3% today, please keep taking the current medications as you are for sugar.     Monitor the left shoulder pain, try to do some of the exercises you learned for the right shoulder     Follow up with Dr Dominguez for the gel and steroid injections.      Continue to monitor you home blood sugars.     Magnesium was low in April 2023. Please increase the magnesium in the diet ( potatoes, tomatoes, bananas, etc. )     [facility] - stable blood counts and kidney function, your triglycerides are still elevated, contienu the choelsterol medications and fish oils.     Continue with regular eye and dental examinations.     Follow up in 4 months  for a medicare wellness examination or sooner as needed

## 2024-02-29 ENCOUNTER — HOSPITAL ENCOUNTER (OUTPATIENT)
Dept: RADIOLOGY | Facility: HOSPITAL | Age: 89
Discharge: HOME | End: 2024-02-29
Payer: MEDICARE

## 2024-02-29 DIAGNOSIS — R10.9 ACUTE RIGHT FLANK PAIN: ICD-10-CM

## 2024-02-29 DIAGNOSIS — R31.29 OTHER MICROSCOPIC HEMATURIA: ICD-10-CM

## 2024-02-29 PROCEDURE — 74176 CT ABD & PELVIS W/O CONTRAST: CPT

## 2024-02-29 PROCEDURE — 74176 CT ABD & PELVIS W/O CONTRAST: CPT | Performed by: RADIOLOGY

## 2024-04-04 ENCOUNTER — OFFICE VISIT (OUTPATIENT)
Dept: ENDOCRINOLOGY | Facility: CLINIC | Age: 89
End: 2024-04-04
Payer: MEDICARE

## 2024-04-04 VITALS
SYSTOLIC BLOOD PRESSURE: 145 MMHG | DIASTOLIC BLOOD PRESSURE: 74 MMHG | HEART RATE: 68 BPM | BODY MASS INDEX: 34.32 KG/M2 | WEIGHT: 232.4 LBS

## 2024-04-04 DIAGNOSIS — E11.9 CONTROLLED TYPE 2 DIABETES MELLITUS WITHOUT COMPLICATION, WITH LONG-TERM CURRENT USE OF INSULIN (MULTI): Primary | ICD-10-CM

## 2024-04-04 DIAGNOSIS — Z79.4 CONTROLLED TYPE 2 DIABETES MELLITUS WITHOUT COMPLICATION, WITH LONG-TERM CURRENT USE OF INSULIN (MULTI): Primary | ICD-10-CM

## 2024-04-04 DIAGNOSIS — E78.2 MIXED HYPERLIPIDEMIA: ICD-10-CM

## 2024-04-04 DIAGNOSIS — I10 PRIMARY HYPERTENSION: ICD-10-CM

## 2024-04-04 PROCEDURE — 99214 OFFICE O/P EST MOD 30 MIN: CPT | Performed by: NURSE PRACTITIONER

## 2024-04-04 PROCEDURE — 1160F RVW MEDS BY RX/DR IN RCRD: CPT | Performed by: NURSE PRACTITIONER

## 2024-04-04 PROCEDURE — 3077F SYST BP >= 140 MM HG: CPT | Performed by: NURSE PRACTITIONER

## 2024-04-04 PROCEDURE — 1126F AMNT PAIN NOTED NONE PRSNT: CPT | Performed by: NURSE PRACTITIONER

## 2024-04-04 PROCEDURE — 3078F DIAST BP <80 MM HG: CPT | Performed by: NURSE PRACTITIONER

## 2024-04-04 PROCEDURE — 1159F MED LIST DOCD IN RCRD: CPT | Performed by: NURSE PRACTITIONER

## 2024-04-04 RX ORDER — SEMAGLUTIDE 0.68 MG/ML
0.25 INJECTION, SOLUTION SUBCUTANEOUS
Qty: 6 ML | Refills: 1 | Status: SHIPPED | OUTPATIENT
Start: 2024-04-04

## 2024-04-04 ASSESSMENT — PAIN SCALES - GENERAL: PAINLEVEL: 0-NO PAIN

## 2024-04-04 ASSESSMENT — ENCOUNTER SYMPTOMS: DEPRESSION: 0

## 2024-04-04 NOTE — PROGRESS NOTES
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 A1C today 7.3%. Pt is testing sugars 4 times per day. Pt is having low sugars 0 times/week. Pt's typical blood sugars are running under 140 AM under 200 before bed. Pt is following a carb controlled diet and knows reasonable carb allowances. Pt is able to afford their medications. Pt is exercising.           -INSULIN Lantus 45 units, Novolog 30 units, isf-30 over 150, Victoza wants to try Ozempic again if covered          -STATIN Atorvastatin 40 mg tolerating LDL          -on several bp/cv meds, see below         -taking amiodarone since spring 2023 (TSH 6 months)    90 day surjit 2 data: 65% in range, 1% low, pattern: mid 100's overnight, mid/upper 100's through the day.      Current Outpatient Medications:     amiodarone (Pacerone) 200 mg tablet, Take 1 tablet (200 mg) by mouth once daily., Disp: , Rfl:     amLODIPine (Norvasc) 10 mg tablet, Take by mouth once daily., Disp: , Rfl:     aspirin 81 mg EC tablet, 1 tablet (81 mg) 1 time., Disp: , Rfl:     atenolol (Tenormin) 25 mg tablet, Take by mouth once daily., Disp: , Rfl:     atorvastatin (Lipitor) 40 mg tablet, Take 1 tablet (40 mg) by mouth once daily., Disp: , Rfl:     blood sugar diagnostic strip, Per endo, Disp: , Rfl:     brimonidine-timoloL (Combigan) 0.2-0.5 % ophthalmic solution, , Disp: , Rfl:     clopidogrel (Plavix) 75 mg tablet, , Disp: , Rfl:     cyanocobalamin (Vitamin B-12) 1,000 mcg/mL injection, Inject 1 mL (1,000 mcg) into the muscle every 30 (thirty) days., Disp: , Rfl:     fish oil concentrate (Omega-3) 120-180 mg capsule, Take 1 capsule (1 g) by mouth., Disp: , Rfl:     fluocinonide 0.05 % cream, Apply 1 Application topically once daily as needed for rash (itch)., Disp: , Rfl:     FLUoxetine (PROzac) 20 mg capsule, Take 1 capsule (20 mg) by mouth once daily., Disp: , Rfl:     fluticasone (Flonase) 50 mcg/actuation nasal spray, Administer 2  sprays into each nostril once daily in the morning., Disp: , Rfl:     FreeStyle Junior 2 Sensor kit, USE 1 SENSOR AS DIRECTED EVERY 14 DAYS, E11.65 ON INSULIN, Disp: , Rfl:     furosemide (Lasix) 40 mg tablet, Take 1 tablet (40 mg) by mouth once every day., Disp: , Rfl:     gabapentin (Neurontin) 600 mg tablet, Take 1 tablet (600 mg) by mouth 3 times a day., Disp: , Rfl:     garlic 1,000 mg capsule, Garlic 1000 MG Oral Capsule  Refills: 0     Active, Disp: , Rfl:     insulin aspart (NovoLOG FlexPen U-100 Insulin) 100 unit/mL (3 mL) pen, Inject 30 Units under the skin 3 times a day before meals., Disp: , Rfl:     insulin glargine (Basaglar KwikPen U-100 Insulin) 100 unit/mL (3 mL) pen, 40 Units., Disp: , Rfl:     irbesartan (Avapro) 300 mg tablet, Take 1 tablet (300 mg) by mouth once every day., Disp: , Rfl:     isosorbide mononitrate ER (Imdur) 60 mg 24 hr tablet, Take 1 tablet (60 mg) by mouth once daily in the morning., Disp: , Rfl:     latanoprost (Xalatan) 0.005 % ophthalmic solution, Latanoprost 0.005 % Ophthalmic Solution  Quantity: 8  Refills: 0      Start : 23-May-2022  Active, Disp: , Rfl:     liraglutide (Victoza) 0.6 mg/0.1 mL (18 mg/3 mL) injection, Inject 0.3 mL (1.8 mg) under the skin once daily., Disp: , Rfl:     loratadine (Claritin Liqui-Gel) 10 mg capsule, Claritin 10 MG Oral Capsule  Refills: 0     Active, Disp: , Rfl:     losartan (Cozaar) 50 mg tablet, , Disp: , Rfl:     magnesium oxide (Mag-Ox) 400 mg (241.3 mg magnesium) tablet, Take by mouth., Disp: , Rfl:     magnesium oxide 240 mg magnesium powder in packet, Take by mouth., Disp: , Rfl:     meloxicam (Mobic) 7.5 mg tablet, Take 1 tablet (7.5 mg) by mouth once daily., Disp: 90 tablet, Rfl: 3    methocarbamol (Robaxin) 500 mg tablet, Take 1 tablet (500 mg) by mouth 3 times a day as needed for muscle spasms., Disp: 40 tablet, Rfl: 1    metoprolol succinate XL (Toprol-XL) 50 mg 24 hr tablet, Take 1 tablet (50 mg) by mouth once every day., Disp:  , Rfl:     mometasone (Nasonex) 50 mcg/actuation nasal spray, Administer 1 spray into affected nostril(s)., Disp: , Rfl:     multivitamin tablet, Multivitamins TABS  Refills: 0     Active, Disp: , Rfl:     nitroglycerin (Nitrostat) 0.4 mg SL tablet, Place 1 tablet under the tongue every 5 minutes for up to 3 doses as needed for chest pain. Call 911 if pain persists, Disp: , Rfl:     NON FORMULARY, VELTRIX, Disp: , Rfl:     omega-3 1,000 mg capsule capsule, Take 5 capsules (5,000 mg) by mouth once daily., Disp: , Rfl:     omeprazole (PriLOSEC) 20 mg DR capsule, Take 1 capsule (20 mg) by mouth once daily., Disp: , Rfl:     oxygen (O2) therapy, Inhale 2 L/min at 120,000 mL/hr. AT NIGHT.  OXYGEN LIQUID, Disp: , Rfl:     pantoprazole (ProtoNix) 40 mg EC tablet, TAKE 1 TABLET BY MOUTH EVERY DAY, Disp: 90 tablet, Rfl: 3    rivaroxaban (Xarelto) 20 mg tablet, 1 tablet (20 mg) once daily at bedtime., Disp: , Rfl:     saw palmetto 500 mg capsule, Saw Palmetto 500 MG Oral Capsule  Refills: 0     Active, Disp: , Rfl:     semaglutide (Ozempic) 0.25 mg or 0.5 mg (2 mg/3 mL) pen injector, Inject 0.25 mg under the skin 1 (one) time per week. 0.25 mg weekly x 4 weeks then increase to 0.50 mg weekly, Disp: 6 mL, Rfl: 1    spironolacton-hydrochlorothiaz (Aldactazide) 25-25 mg tablet, Take 1 tablet (25 mg) by mouth once daily., Disp: , Rfl:     terbinafine (LamISIL) 250 mg tablet, Take 1 tablet (250 mg) by mouth once daily., Disp: , Rfl:     traMADol (Ultram) 50 mg tablet, Take 1 tablet (50 mg) by mouth every 6 hours., Disp: , Rfl:     triamcinolone (Kenalog) 0.1 % cream, APPLY TO AFFECTED AREA ONCE DAILY AS NEEDED FOR RASH/ITCHING *30DS* *GIVE MD OFFICE A CALL*, Disp: , Rfl:       Allergies as of 04/04/2024 - Reviewed 04/04/2024   Allergen Reaction Noted    Iodinated contrast media Anaphylaxis, Unknown, and Other 11/06/2006    Cyclobenzaprine Unknown 04/06/2023    Epinephrine Unknown 04/06/2023    Metformin Diarrhea 03/03/2020     Midazolam Unknown 04/06/2023    Nizatidine Unknown 08/19/2012    Oxycodone-acetaminophen Unknown 04/06/2023    Shellfish derived Unknown 08/02/2019    Terazosin Unknown 08/19/2012    Adhesive Rash 06/21/2013    Amoxicillin-pot clavulanate Unknown, Rash, Hives, and Other 11/30/2018    Indomethacin Other, Unknown, and Rash 11/21/2005         Review of Systems   Cardiology: Lightheadedness-denies.  Chest pain-denies.  Leg edema-denies.  Palpitations-denies.  Respiratory: Cough-denies. Shortness of breath-denies.  Wheezing-denies.  Gastroenterology: Constipation-denies.  Diarrhea-denies.  Heartburn-denies.  Endocrinology: Cold intolerance-denies.  Heat intolerance-denies.  Sweats-denies.  Neurology: Headache-denies.  Tremor-denies.  Neuropathy in extremities-denies.  Psychology: Low energy-denies.  Irritability-denies.  Sleep disturbances-denies.      /74 (BP Location: Left arm, Patient Position: Sitting)   Pulse 68   Wt 105 kg (232 lb 6.4 oz)   BMI 34.32 kg/m²       Labs:  Lab Results   Component Value Date    WBC 9.6 04/17/2023    NRBC 0.2 05/03/2019    RBC 4.21 (L) 04/17/2023    HGB 12.7 (L) 04/17/2023    HCT 39.0 (L) 04/17/2023     04/17/2023     Lab Results   Component Value Date    CALCIUM 9.4 04/17/2023    AST 31 04/17/2023    ALKPHOS 37 04/17/2023    BILITOT 0.7 04/17/2023    PROT 7.7 04/17/2023    ALBUMIN 4.5 04/17/2023     04/17/2023    K 4.0 04/17/2023     04/17/2023    CO2 28 04/17/2023    ANIONGAP 13 04/17/2023    BUN 21 04/17/2023    CREATININE 1.07 04/17/2023    GLUCOSE 128 (H) 04/17/2023    ALT 21 04/17/2023     Lab Results   Component Value Date    CHOL 112 08/19/2022    TRIG 261 (H) 08/19/2022    HDL 30.6 (A) 08/19/2022     Lab Results   Component Value Date    TSH 1.70 04/17/2023     Lab Results   Component Value Date    PZQNUMPW79 610 09/21/2021     Lab Results   Component Value Date    HGBA1C 7.3 (A) 02/23/2024         Physical Exam   General Appearance: pleasant,  cooperative, no acute distress  HEENT: no chemosis, no proptosis, no lid lag, no lid retraction  Neck: no lymphadenopathy, no thyromegaly, no dominant thyroid nodules  Heart: murmur, irregular rate and rhythm, S1 and S2  Lungs: no wheezes, no rhonci, no rales  Extremities: no lower extremity swelling      Assessment/Plan   1. Controlled type 2 diabetes mellitus without complication, with long-term current use of insulin (CMS/[facility])  -would benefit from Ozempic over Victoza for CV and renal benefits, glycemic control and weight loss  -RX sent to [facility]  -reviewed potential side effects with GLP1 medication  -will stop Victoza if Ozempic is approved  -reviewed target BS  -reviewed recommended carbs per meal/snack    - semaglutide (Ozempic) 0.25 mg or 0.5 mg (2 mg/3 mL) pen injector; Inject 0.25 mg under the skin 1 (one) time per week. 0.25 mg weekly x 4 weeks then increase to 0.50 mg weekly  Dispense: 6 mL; Refill: 1    2. Mixed hyperlipidemia  -tolerates statin  -LDL at target    3. Primary hypertension  -stable     Follow Up: CNP 6 months      -labs/tests/notes reviewed  -reviewed and counseled patient on medication monitoring and side effects  Medical Decision Making  Complexity of problem: Chronic illness of diabetes mellitus uncontrolled, progressing  Data analyzed and reviewed: Reviewed prior notes, blood glucose data, labs including HgbA1c, lipids, serum chemistries.  Ordered tests.   Risk of complications and morbidities: Is definite because of use of insulin and risk of hypoglycemia.  Prescription medications reviewed and modifications made.  Compliance assessed.  Addressed social determinants of health including food insecurity.

## 2024-04-16 ENCOUNTER — OFFICE VISIT (OUTPATIENT)
Dept: PRIMARY CARE | Facility: CLINIC | Age: 89
End: 2024-04-16
Payer: MEDICARE

## 2024-04-16 VITALS
WEIGHT: 237 LBS | SYSTOLIC BLOOD PRESSURE: 120 MMHG | DIASTOLIC BLOOD PRESSURE: 68 MMHG | OXYGEN SATURATION: 96 % | HEART RATE: 71 BPM | HEIGHT: 69 IN | BODY MASS INDEX: 35.1 KG/M2

## 2024-04-16 DIAGNOSIS — E11.9 CONTROLLED TYPE 2 DIABETES MELLITUS WITHOUT COMPLICATION, WITHOUT LONG-TERM CURRENT USE OF INSULIN (MULTI): ICD-10-CM

## 2024-04-16 DIAGNOSIS — N13.8 BENIGN PROSTATIC HYPERPLASIA WITH URINARY OBSTRUCTION: Primary | ICD-10-CM

## 2024-04-16 DIAGNOSIS — E78.00 PURE HYPERCHOLESTEROLEMIA: ICD-10-CM

## 2024-04-16 DIAGNOSIS — N40.1 BENIGN PROSTATIC HYPERPLASIA WITH URINARY OBSTRUCTION: Primary | ICD-10-CM

## 2024-04-16 DIAGNOSIS — I10 PRIMARY HYPERTENSION: ICD-10-CM

## 2024-04-16 PROCEDURE — 99214 OFFICE O/P EST MOD 30 MIN: CPT | Performed by: FAMILY MEDICINE

## 2024-04-16 PROCEDURE — G2211 COMPLEX E/M VISIT ADD ON: HCPCS | Performed by: FAMILY MEDICINE

## 2024-04-16 PROCEDURE — 1036F TOBACCO NON-USER: CPT | Performed by: FAMILY MEDICINE

## 2024-04-16 PROCEDURE — 3078F DIAST BP <80 MM HG: CPT | Performed by: FAMILY MEDICINE

## 2024-04-16 PROCEDURE — 1160F RVW MEDS BY RX/DR IN RCRD: CPT | Performed by: FAMILY MEDICINE

## 2024-04-16 PROCEDURE — 3074F SYST BP LT 130 MM HG: CPT | Performed by: FAMILY MEDICINE

## 2024-04-16 PROCEDURE — 1159F MED LIST DOCD IN RCRD: CPT | Performed by: FAMILY MEDICINE

## 2024-04-16 ASSESSMENT — ENCOUNTER SYMPTOMS: OCCASIONAL FEELINGS OF UNSTEADINESS: 0

## 2024-04-16 NOTE — PROGRESS NOTES
Subjective   Reason for Visit: William Mckinnon is an 88 y.o. male here for a 4 month follow up     Reviewed all medications by prescribing practitioner or clinical pharmacist (such as prescriptions, OTCs, herbal therapies and supplements) and documented in the medical record.    HPI  Wears hearing aids    Reports change in urine odor for the last few months  - is darker   - UA show microscopic blood - no h/o kidney stones  - has had more pain in the right sacral area   - does seem better today   - was hit in the side with a baseball (early 20s) and was in the hospital since he was peeing blood   - CMP was good on 4/17/23, GFR 76 and Creatitnei 1.07     #) 8/5/23 was sting by a bunch of bees   - healing now   - started on benadryl right away   - no need for steroids or epi    #) right shoulder pain - very good, doing much better with PT with Luc   - OA and xray in Feb  - pain down to 4-5/10   - but since then has had a fall on 7/7/23   - saw Dr. Mckoy PA,  was told the pain was muscular and they gave him a Cortizone shot, but it didn't help.   - will also try robaxin   - and an order for PT     #) T2DM - controlled   - on  Novolog 31 and 1.8 victoza every AM , 25 Units at lunch, then 32 with diner and then Lantus 40 units at bedtime  - has a Junior - readings have been higher recently   - last A1c 6.2% --> 6.7% on 7/13/23--> 7.3% 2/23/24   - follows with Dr Mohamud - last seen on October 2023     #) Afib - stable   Hammot in [place]   Started on 3/28/23 with Afib RVR with HR of 140   Admitted and did a cardiovertion   Is feeling a little better   Is on amiodarone and rivaroxaban   Stopped another one, irbesartan   Checking BP at home 120/60  HR is 55-77  Follow up with cardiologist , Dr Neil   Saw endocrinologist yesterday , Dr Mohamud  with A1c 7.1%   - started on ozempic over the victoza     Patient Care Team:  Marlin Romo DO as PCP - General (Family Medicine)  Mar[facility] Rom[facility] PCP [facility]  "Provider  Marlin Romo, DO     Review of Systems  ROS was completed and all systems are negative with the exception of what was noted in the the HPI.     Objective   Vitals:  /68   Pulse 71   Ht 1.753 m (5' 9\")   Wt 108 kg (237 lb)   SpO2 96%   BMI 35.00 kg/m²       Physical Exam  GEN: A+O, no acute distress  HEENT: NC/AT, Oropharynx clear, no exudates, TM visualized, Extraoccular muscles intact, no facial droop; no thyromegaly or cervical LAD  RESP: CTAB, no wheezes   CV: RRR, no murmurs  ABD: soft, non-tender, + BS  SKIN: no rashes or bruising, no peripheral edema   NEURO: CN II-XII grossly intact, moves all extremities equally, no tremor   PSYCH: normal affect, appropriate mood     Assessment/Plan   Problem List Items Addressed This Visit    None    Congrats on upcoming 67th wedding anniversary     Please call around and see about doing some vestibular rehab for the balance concerns.   Rehab was reordered last visit.     [facility]    The urine showed some blood in the urine.   [facility] to explore the microscopic blood in the urine and to review the enlargement of the prostate   We order a microscopic urine test if you would like. ( You can hold off on thi for now since you are seeing the urologist)     We reviewed the CT of the abdomen on 2/29/24-  1.  No definite renal stones or hydronephrosis.  2. Bilateral renal cyst with a smaller cyst in the left kidney being slightly complex and contain a small peripheral calcification.  3. Status post cholecystectomy.  4.  Bladder wall thickening. Clinical correlation and further evaluation may be obtained as clinically warranted.  5. Enlarged prostate gland. PSA was good on 4/1/24 at 2.09  6. Chronic lung changes in the lung bases, due to previous smoking history.     Vitamin D is low, increase to 4000IU Vitamin D3 per day       Your A1c was 7.3% 2/23/24, please keep taking the " current medications as you are for sugar.   4/1/24- A1c was 7.7%     Monitor the left shoulder pain, try to do some of the exercises you learned for the right shoulder     Follow up with Dr Dominguez for the gel and steroid injections of the knee, [place] that has helped     Magnesium was low in April 2023. Please increase the magnesium in the diet ( potatoes, tomatoes, bananas, etc. )     [facility] - stable blood counts and kidney function, your triglycerides are still elevated, contienu the choelsterol medications and fish oils.     Continue with regular eye and dental examinations.     Follow up on 6/29/24 for a medicare wellness examination or sooner as needed

## 2024-04-16 NOTE — PATIENT INSTRUCTIONS
Congrats on upcoming 67th wedding anniversary     Please call around and see about doing some vestibular rehab for the balance concerns.   Rehab was reordered last visit.     [facility]    The urine showed some blood in the urine.   Please follow up [facility] week to explore the microscopic blood in the urine and to review the enlargement of the prostate     We reviewed the CT of the abdomen on 2/29/24-  1.  No definite renal stones or hydronephrosis.  2. Bilateral renal cyst with a smaller cyst in the left kidney being slightly complex and contain a small peripheral calcification.  3. Status post cholecystectomy.  4.  Bladder wall thickening. Clinical correlation and further evaluation may be obtained as clinically warranted.  5. Enlarged prostate gland. PSA was good on 4/1/24 at 2.09  6. Chronic lung changes in the lung bases, due to previous smoking history.     Vitamin D is low, increase to 4000IU Vitamin D3 per day       Your A1c was 7.3% 2/23/24, please keep taking the current medications as you are for sugar.   4/1/24- A1c was 7.7%     Monitor the left shoulder pain, try to do some of the exercises you learned for the right shoulder     Follow up with Dr Dominguez for the gel and steroid injections of the knee, glad that has helped     Magnesium was low in April 2023. Please increase the magnesium in the diet ( potatoes, tomatoes, bananas, etc. )     [facility] - stable blood counts and kidney function, your triglycerides are still elevated, contienu the choelsterol medications and fish oils.     Continue with regular eye and dental examinations.     Follow up on 6/29/24 for a medicare wellness examination or sooner as needed

## 2024-06-28 ENCOUNTER — TELEPHONE (OUTPATIENT)
Dept: ENDOCRINOLOGY | Facility: CLINIC | Age: 89
End: 2024-06-28

## 2024-06-28 ENCOUNTER — APPOINTMENT (OUTPATIENT)
Dept: PRIMARY CARE | Facility: CLINIC | Age: 89
End: 2024-06-28
Payer: MEDICARE

## 2024-06-28 VITALS
BODY MASS INDEX: 34.85 KG/M2 | HEART RATE: 65 BPM | DIASTOLIC BLOOD PRESSURE: 74 MMHG | OXYGEN SATURATION: 98 % | SYSTOLIC BLOOD PRESSURE: 118 MMHG | WEIGHT: 236 LBS

## 2024-06-28 DIAGNOSIS — Z00.00 ROUTINE GENERAL MEDICAL EXAMINATION AT HEALTH CARE FACILITY: Primary | ICD-10-CM

## 2024-06-28 ASSESSMENT — ACTIVITIES OF DAILY LIVING (ADL)
DOING_HOUSEWORK: INDEPENDENT
BATHING: INDEPENDENT
MANAGING_FINANCES: INDEPENDENT
GROCERY_SHOPPING: INDEPENDENT
TAKING_MEDICATION: INDEPENDENT
DRESSING: INDEPENDENT

## 2024-06-28 NOTE — PATIENT INSTRUCTIONS
Congrats on your 67th wedding anniversary (5/3/24)   Happy belated 89th birthday!!     For the GI upset, make sure to stay hydrated, take the heartburn mediation as recommended  Avoid trigger food, such as acid or high fat foods.     Please call around and see about doing some vestibular rehab for the balance concerns.   Rehab was reordered last visit.     [facility]    Please [facility] to explore the microscopic blood in the urine and to review the enlargement of the prostate -- please call and schedule the appt.     We reviewed the CT of the abdomen on 2/29/24-  1.  No definite renal stones or hydronephrosis.  2. Bilateral renal cyst with a smaller cyst in the left kidney being slightly complex and contain a small peripheral calcification.  3. Status post cholecystectomy.  4.  Bladder wall thickening. Clinical correlation and further evaluation may be obtained as clinically warranted.  5. Enlarged prostate gland. PSA was good on 4/1/24 at 2.09  6. Chronic lung changes in the lung bases, due to previous smoking history.     Vitamin D has been, increase to 4000IU Vitamin D3 per day       Monitor the left shoulder pain, try to do some of the exercises you learned for the right shoulder     Follow up with Dr Dominguez for the gel and steroid injections of the knee, [place] that has helped     Magnesium was low in April 2023. Please increase the magnesium in the diet ( potatoes, tomatoes, bananas, etc. )     Continue with regular eye and dental examinations.     Follow up in 6 month for follow up or sooner as needed

## 2024-06-28 NOTE — TELEPHONE ENCOUNTER
Patient called states he is having difficulty controlling his BS.  It doesn't matter what he eats or how much insulin he takes.     Per patient his BS has been ranging from 190-316.     Patient states he can go from 160 to 220 within one hour.     Ever since getting sick with nausea vomiting, diarrhea and dizziness he has had this problem.  Patient states this occurs randomly.     Please assist and advise.   April

## 2024-06-28 NOTE — TELEPHONE ENCOUNTER
Patient called and verbally made aware of message below from MP.  He states he does not take Ozempic.     April

## 2024-06-28 NOTE — PROGRESS NOTES
Subjective   Reason for Visit: William Mckinnon is an 89 y.o. male here for a Medicare annual Wellness     Reviewed all medications by prescribing practitioner or clinical pharmacist (such as prescriptions, OTCs, herbal therapies and supplements) and documented in the medical record.    HPI  Wears hearing aids  Recent pulmonary function tests. - to follow up with pulm for results.   Eye doctor yesterday with no Diabetic retinopathy, some concern of the glaucoma    #) Reports change in urine odor for the last few months- better   - is darker -- much clearing   - UA show microscopic blood - no h/o kidney stones  - has had more pain in the right sacral area   - does seem better today   - was hit in the side with a baseball (early 20s) and was in the hospital since he was peeing blood   - CMP was good on 4/17/23, GFR 76 and Creatitnei 1.07     #) 8/5/23 was sting by a bunch of bees   - healing now   - started on benadryl right away   - no need for steroids or epi    #) right shoulder pain - very good, doing much better with PT with Luc   - OA and xray in Feb  - pain down to 4-5/10   - but since then has had a fall on 7/7/23   - saw EDMOND Mckeon,  was told the pain was muscular and they gave him a Cortizone shot, but it didn't help.   - will also try robaxin   - and an order for PT     #) T2DM - controlled   - on  Novolog 31 and 1.8 victoza every AM , 25 Units at lunch, then 32 with diner and then Lantus 40 units at bedtime  - has a Junior - readings have been higher recently   - last A1c 6.2% --> 6.7% on 7/13/23--> 7.3% 2/23/24   - recent elevation in sugars after flu like symptoms after a grad party about one month ago   - follows with Dr Mohamud - rec increase lantus for 2 units , follow up scheduled on 10/3/24   - podiatry 7/11/24 -     #) Afib - stable   Hammot in [place]   Started on 3/28/23 with Afib RVR with HR of 140   Admitted and did a cardiovertion   Is feeling a little better   Is on amiodarone and rivaroxaban    Stopped another one, irbesartan   Checking BP at home 120/60  HR is 55-77  Follow up with cardiologist , Dr Neil   Saw endocrinologist yesterday , Dr Mohamud  with A1c 7.1%   - started on ozempic over the victoza     Patient Care Team:  Marlin Romo DO as PCP - General (Family Medicine)  Marlin Romo DO [facility] PCP [facility]  Marlin Romo DO     Review of Systems  ROS was completed and all systems are negative with the exception of what was noted in the the HPI.     Objective   Vitals:  /74   Pulse 65   Wt 107 kg (236 lb)   SpO2 98%   BMI 34.85 kg/m²       Physical Exam  GEN: A+O, no acute distress  HEENT: NC/AT, Oropharynx clear, no exudates, TM visualized, Extraoccular muscles intact, no facial droop; no thyromegaly or cervical LAD  RESP: CTAB, no wheezes   CV: RRR, no murmurs  ABD: soft, non-tender, + BS  SKIN: no rashes or bruising, no peripheral edema   NEURO: CN II-XII grossly intact, moves all extremities equally, no tremor   PSYCH: normal affect, appropriate mood     Assessment/Plan   Problem List Items Addressed This Visit    None    Congrats on upcoming 66th wedding anniversary (5/3/24)   Happy belated 89th birthday!!     For the GI upset, make sure to stay hydrated, take the heartburn mediation as recommended  Avoid trigger food, such as acid or high fat foods.     Please call around and see about doing some vestibular rehab for the balance concerns.   Rehab was reordered last visit.     [facility]    Please [facility] to explore the microscopic blood in the urine and to review the enlargement of the prostate -- please call and schedule the appt.     We reviewed the CT of the abdomen on 2/29/24-  1.  No definite renal stones or hydronephrosis.  2. Bilateral renal cyst with a smaller cyst in the left kidney being slightly complex and contain a small peripheral calcification.  3. Status post cholecystectomy.  4.   Bladder wall thickening. Clinical correlation and further evaluation may be obtained as clinically warranted.  5. Enlarged prostate gland. PSA was good on 4/1/24 at 2.09  6. Chronic lung changes in the lung bases, due to previous smoking history.     Vitamin D has been, increase to 4000IU Vitamin D3 per day       Monitor the left shoulder pain, try to do some of the exercises you learned for the right shoulder     Follow up with Dr Dominguez for the gel and steroid injections of the knee, glad that has helped     Magnesium was low in April 2023. Please increase the magnesium in the diet ( potatoes, tomatoes, bananas, etc. )     Continue with regular eye and dental examinations.     Follow up in 6 month for follow up or sooner as needed

## 2024-07-01 PROBLEM — R31.21 ASYMPTOMATIC MICROSCOPIC HEMATURIA: Status: ACTIVE | Noted: 2024-07-01

## 2024-07-01 PROBLEM — E11.3213 TYPE 2 DIABETES MELLITUS WITH BOTH EYES AFFECTED BY MILD NONPROLIFERATIVE RETINOPATHY AND MACULAR EDEMA, WITHOUT LONG-TERM CURRENT USE OF INSULIN (MULTI): Status: ACTIVE | Noted: 2023-04-06

## 2024-08-06 ENCOUNTER — OFFICE VISIT (OUTPATIENT)
Dept: UROLOGY | Facility: CLINIC | Age: 89
End: 2024-08-06
Payer: MEDICARE

## 2024-08-06 ENCOUNTER — APPOINTMENT (OUTPATIENT)
Dept: UROLOGY | Facility: CLINIC | Age: 89
End: 2024-08-06
Payer: MEDICARE

## 2024-08-06 DIAGNOSIS — R31.0 GROSS HEMATURIA: ICD-10-CM

## 2024-08-06 PROCEDURE — G2211 COMPLEX E/M VISIT ADD ON: HCPCS | Performed by: UROLOGY

## 2024-08-06 PROCEDURE — 99204 OFFICE O/P NEW MOD 45 MIN: CPT | Performed by: UROLOGY

## 2024-08-06 NOTE — PROGRESS NOTES
"  Patient is a 89 y.o. male presents with gross hematuria.      SUBJECTIVE:  HPI   He had gross hematuria a few weeks ago.  He has a good flow.  He takes saw palmetto.  He is on plavix and Mobic.      No results found for: \"KPSAT\", \"KPSAP\"  No results found for: \"UAMICCOMM\"  No results found for: \"TR1\"  No results found for: \"URINECULTURE\"     Past Medical History:   Diagnosis Date    Atrial fibrillation, unspecified type (Multi)     Diabetes (Multi)     type 2    Diabetic polyneuropathy associated with type 2 diabetes mellitus (Multi)     Dyslipidemia     Elevated cholesterol     Essential (primary) hypertension     Hypertension     Macular degeneration     Mixed hyperlipidemia     Neuropathy     Primary hypertension     Retinopathy     Sleep apnea     Type 2 diabetes mellitus with diabetic polyneuropathy, with long-term current use of insulin (Multi)       Past Surgical History:   Procedure Laterality Date    BACK SURGERY      HERNIA REPAIR  2013    Hernia Repair Inguinal Bilateral    KNEE SURGERY Left     OTHER SURGICAL HISTORY  2013    Cath Stent Placement Number Of Stents Placed:    OTHER SURGICAL HISTORY  2013    Mozelle Holes Without Other Surgery    ROTATOR CUFF REPAIR      TOTAL HIP ARTHROPLASTY Right 2014    Total Hip Replacement      Family History   Problem Relation Name Age of Onset    Stroke Mother      Heart disease Mother      Stroke Father      Cancer Sister      Diabetes Sister      Other (Systemic Lupus Erythematosus) Sister      Other (cardiac arrest) Brother      Other (stroke syndrome) Brother        Social History     Socioeconomic History    Marital status:    Tobacco Use    Smoking status: Former     Current packs/day: 0.00     Types: Cigarettes     Quit date:      Years since quittin.6     Passive exposure: Past    Smokeless tobacco: Never   Vaping Use    Vaping status: Never Used   Substance and Sexual Activity    Alcohol use: Yes    Drug use: Not " Currently    Sexual activity: Not Currently        Review of Systems   Constitutional: denies any unintentional weight loss or change in strength.  Integumentary: denies any rashes or pruritus.  Eyes: denies any double vision or eye pain.  Ear/Nose/Mouth/Throat: denies any nosebleeds or gum bleeds.  Cardiovascular: denies any chest pain or syncope.  Respiratory: denies hemoptysis.  Gastrointestinal: denies nausea or vomiting.  Musculoskeletal: denies muscle cramping or weakness.  Neurologic: denies convulsions or seizures.  Hematologic/Lymphatic: denies bleeding tendencies.  Endocrine: denies heat/cold intolerance.  All other systems have been reviewed and are negative unless otherwise noted in the HPI.    OBJECTIVE:  There were no vitals taken for this visit.  Physical Exam   Constitutional: No obvious distress.  Eyes: Non-injected conjunctiva, sclera clear, EOMI.  Ears/Nose/Mouth/Throat: No obvious drainage per ears or nose.  Cardiovascular: Extremities are warm and well perfused. No edema, cyanosis or pallor.  Respiratory: No audible wheezing/stridor; respirations do not appear labored.  Gastrointestinal: Abdomen soft, not distended.  Musculoskeletal: Normal ROM of extremities.  Skin: No obvious rashes or open sores.  Neurologic: Alert and oriented, CN 2-12 grossly intact.  Psychiatric: Answers questions appropriately with normal affect.  Hematologic/Lymphatic/Immunologic: No obvious bruises or sites of spontaneous bleeding.  Genitourinary: No CVA tenderness, bladder not palpable.     Labs:  Lab Results   Component Value Date    WBC 9.6 04/17/2023    HGB 12.7 (L) 04/17/2023    HCT 39.0 (L) 04/17/2023     04/17/2023    CHOL 112 08/19/2022    TRIG 261 (H) 08/19/2022    HDL 30.6 (A) 08/19/2022    LDLDIRECT 37 08/19/2022    ALT 21 04/17/2023    AST 31 04/17/2023     04/17/2023    K 4.0 04/17/2023     04/17/2023    CREATININE 1.07 04/17/2023    BUN 21 04/17/2023    CO2 28 04/17/2023    TSH 1.70  "04/17/2023    INR 1.1 02/04/2018    HGBA1C 7.3 (A) 02/23/2024     No results found for: \"KPSAT\", \"KPSAP\"  IMAGING:        PROCEDURES:    ASSESSMENT/PLAN:  Problem List Items Addressed This Visit    None     He had gross hematuria.  He had a CT without contrast in 2/2024 which identified a complex left renal cyst with a calcification, bladder thickening and an enlarged prostate.     He has BPH,  He is taking saw palmetto.  Monitor voiding.    We reviewed evaluation of his hematuria.  He will follow up for cystoscopy.  He has a contrast allergy.  A renal ultrasound was ordered.    All questions were answered to the patient’s satisfaction.  Patient agrees with the plan and wishes to proceed.  Follow-up will be scheduled appropriately.     Kelechi Marie MD  "

## 2024-08-15 ENCOUNTER — HOSPITAL ENCOUNTER (OUTPATIENT)
Dept: RADIOLOGY | Facility: HOSPITAL | Age: 89
Discharge: HOME | End: 2024-08-15
Payer: MEDICARE

## 2024-08-15 DIAGNOSIS — R31.0 GROSS HEMATURIA: ICD-10-CM

## 2024-08-15 PROCEDURE — 76770 US EXAM ABDO BACK WALL COMP: CPT

## 2024-08-15 PROCEDURE — 76770 US EXAM ABDO BACK WALL COMP: CPT | Performed by: RADIOLOGY

## 2024-08-22 ENCOUNTER — HOSPITAL ENCOUNTER (EMERGENCY)
Facility: HOSPITAL | Age: 89
Discharge: HOME | End: 2024-08-22
Attending: EMERGENCY MEDICINE
Payer: MEDICARE

## 2024-08-22 ENCOUNTER — APPOINTMENT (OUTPATIENT)
Dept: RADIOLOGY | Facility: HOSPITAL | Age: 89
End: 2024-08-22
Payer: MEDICARE

## 2024-08-22 VITALS
HEIGHT: 69 IN | BODY MASS INDEX: 34.96 KG/M2 | HEART RATE: 67 BPM | TEMPERATURE: 97.2 F | DIASTOLIC BLOOD PRESSURE: 74 MMHG | WEIGHT: 236 LBS | RESPIRATION RATE: 18 BRPM | OXYGEN SATURATION: 100 % | SYSTOLIC BLOOD PRESSURE: 175 MMHG

## 2024-08-22 DIAGNOSIS — S89.91XA SOFT TISSUE INJURY OF RIGHT LOWER LEG, INITIAL ENCOUNTER: Primary | ICD-10-CM

## 2024-08-22 LAB
BASOPHILS # BLD AUTO: 0.06 X10*3/UL (ref 0–0.1)
BASOPHILS NFR BLD AUTO: 1 %
EOSINOPHIL # BLD AUTO: 0.23 X10*3/UL (ref 0–0.4)
EOSINOPHIL NFR BLD AUTO: 3.8 %
ERYTHROCYTE [DISTWIDTH] IN BLOOD BY AUTOMATED COUNT: 13.3 % (ref 11.5–14.5)
HCT VFR BLD AUTO: 33.8 % (ref 41–52)
HGB BLD-MCNC: 11.1 G/DL (ref 13.5–17.5)
HOLD SPECIMEN: NORMAL
HOLD SPECIMEN: NORMAL
IMM GRANULOCYTES # BLD AUTO: 0.03 X10*3/UL (ref 0–0.5)
IMM GRANULOCYTES NFR BLD AUTO: 0.5 % (ref 0–0.9)
INR PPP: 1.8 (ref 0.9–1.1)
LYMPHOCYTES # BLD AUTO: 1.74 X10*3/UL (ref 0.8–3)
LYMPHOCYTES NFR BLD AUTO: 28.6 %
MCH RBC QN AUTO: 30.4 PG (ref 26–34)
MCHC RBC AUTO-ENTMCNC: 32.8 G/DL (ref 32–36)
MCV RBC AUTO: 93 FL (ref 80–100)
MONOCYTES # BLD AUTO: 0.49 X10*3/UL (ref 0.05–0.8)
MONOCYTES NFR BLD AUTO: 8.1 %
NEUTROPHILS # BLD AUTO: 3.53 X10*3/UL (ref 1.6–5.5)
NEUTROPHILS NFR BLD AUTO: 58 %
NRBC BLD-RTO: 0 /100 WBCS (ref 0–0)
PLATELET # BLD AUTO: 161 X10*3/UL (ref 150–450)
PROTHROMBIN TIME: 20.2 SECONDS (ref 9.8–12.8)
RBC # BLD AUTO: 3.65 X10*6/UL (ref 4.5–5.9)
WBC # BLD AUTO: 6.1 X10*3/UL (ref 4.4–11.3)

## 2024-08-22 PROCEDURE — 36415 COLL VENOUS BLD VENIPUNCTURE: CPT | Performed by: EMERGENCY MEDICINE

## 2024-08-22 PROCEDURE — 85610 PROTHROMBIN TIME: CPT | Performed by: EMERGENCY MEDICINE

## 2024-08-22 PROCEDURE — 73552 X-RAY EXAM OF FEMUR 2/>: CPT | Mod: RIGHT SIDE | Performed by: RADIOLOGY

## 2024-08-22 PROCEDURE — 99283 EMERGENCY DEPT VISIT LOW MDM: CPT

## 2024-08-22 PROCEDURE — 73552 X-RAY EXAM OF FEMUR 2/>: CPT | Mod: RT

## 2024-08-22 PROCEDURE — 85025 COMPLETE CBC W/AUTO DIFF WBC: CPT | Performed by: EMERGENCY MEDICINE

## 2024-08-22 RX ORDER — TRAMADOL HYDROCHLORIDE 50 MG/1
50 TABLET ORAL EVERY 6 HOURS PRN
Qty: 12 TABLET | Refills: 0 | Status: SHIPPED | OUTPATIENT
Start: 2024-08-22 | End: 2024-08-25

## 2024-08-22 ASSESSMENT — PAIN - FUNCTIONAL ASSESSMENT: PAIN_FUNCTIONAL_ASSESSMENT: 0-10

## 2024-08-22 ASSESSMENT — PAIN DESCRIPTION - LOCATION: LOCATION: LEG

## 2024-08-22 ASSESSMENT — PAIN DESCRIPTION - ORIENTATION: ORIENTATION: RIGHT

## 2024-08-22 ASSESSMENT — PAIN DESCRIPTION - PAIN TYPE: TYPE: ACUTE PAIN

## 2024-08-22 ASSESSMENT — PAIN SCALES - GENERAL: PAINLEVEL_OUTOF10: 5 - MODERATE PAIN

## 2024-08-22 NOTE — ED PROVIDER NOTES
[facility]   ED  Provider Note  8/22/2024 11:53 AM  AC10/AC10      Chief Complaint   Patient presents with   • Leg Injury        History of Present Illness:   William Mckinnon is a 89 y.o. male presenting to the ED for right thigh pain, beginning 3 days ago.  The complaint has been persistent, moderate to severe in severity, and worsened by ambulation.  Patient was out in his yard talking to a tree cutting specialist about his work to be done when he caught his right foot in a rut in the yard and turned.  He did not fall and had a popping sensation in the right lateral hip region.  He has noted ongoing bruising in this area as well as over the anterior mid thigh.  He denies any pain with ambulation.  He denies any fever or chills.  Patient has a history of atrial fibrillation and is taking Xarelto and baby aspirin.      Review of Systems:   Pertinent positives and review of systems as noted above.  Remaining 10 review of systems is negative or noncontributory to today's episode of care.  Review of Systems       --------------------------------------------- PAST HISTORY ---------------------------------------------  Past Medical History:   Past Medical History:   Diagnosis Date   • Atrial fibrillation, unspecified type (Multi)    • Diabetes (Multi)     type 2   • Diabetic polyneuropathy associated with type 2 diabetes mellitus (Multi)    • Dyslipidemia    • Elevated cholesterol    • Essential (primary) hypertension    • Hypertension    • Macular degeneration    • Mixed hyperlipidemia    • Neuropathy    • Primary hypertension    • Retinopathy    • Sleep apnea    • Type 2 diabetes mellitus with diabetic polyneuropathy, with long-term current use of insulin (Multi)         Past Surgical History:   Past Surgical History:   Procedure Laterality Date   • BACK SURGERY     • HERNIA REPAIR  07/17/2013    Hernia Repair Inguinal Bilateral   • KNEE SURGERY Left    • OTHER SURGICAL HISTORY  07/17/2013    Cath Stent Placement Number  Of Stents Placed:   • OTHER SURGICAL HISTORY  07/17/2013    [place] Holes Without Other Surgery   • ROTATOR CUFF REPAIR     • TOTAL HIP ARTHROPLASTY Right 08/28/2014    Total Hip Replacement        Social History:   Social History     Social History Narrative   • Not on file        Family History: family history includes Cancer in his sister; Diabetes in his sister; Heart disease in his mother; Stroke in his father and mother; Systemic Lupus Erythematosus in his sister; cardiac arrest in his brother; stroke syndrome in his brother. Unless otherwise noted, family history is non contributory    Patient's Medications   New Prescriptions    No medications on file   Previous Medications    AMIODARONE (PACERONE) 200 MG TABLET    Take 1 tablet (200 mg) by mouth once daily.    AMLODIPINE (NORVASC) 10 MG TABLET    Take by mouth once daily.    ASPIRIN 81 MG EC TABLET    1 tablet (81 mg) 1 time.    ATENOLOL (TENORMIN) 25 MG TABLET    Take by mouth once daily.    ATORVASTATIN (LIPITOR) 40 MG TABLET    Take 1 tablet (40 mg) by mouth once daily.    BLOOD SUGAR DIAGNOSTIC STRIP    Per endo    BRIMONIDINE-TIMOLOL (COMBIGAN) 0.2-0.5 % OPHTHALMIC SOLUTION        CLOPIDOGREL (PLAVIX) 75 MG TABLET        CYANOCOBALAMIN (VITAMIN B-12) 1,000 MCG/ML INJECTION    Inject 1 mL (1,000 mcg) into the muscle every 30 (thirty) days.    FISH OIL CONCENTRATE (OMEGA-3) 120-180 MG CAPSULE    Take 1 capsule (1 g) by mouth.    FLUOCINONIDE 0.05 % CREAM    Apply 1 Application topically once daily as needed for rash (itch).    FLUOXETINE (PROZAC) 20 MG CAPSULE    Take 1 capsule (20 mg) by mouth once daily.    FLUTICASONE (FLONASE) 50 MCG/ACTUATION NASAL SPRAY    Administer 2 sprays into each nostril once daily in the morning.    FREESTYLE HANNA 2 SENSOR KIT    USE 1 SENSOR AS DIRECTED EVERY 14 DAYS, E11.65 ON INSULIN    FUROSEMIDE (LASIX) 40 MG TABLET    Take 1 tablet (40 mg) by mouth once every day.    GABAPENTIN (NEURONTIN) 600 MG TABLET    Take 1 tablet  (600 mg) by mouth 3 times a day.    GARLIC 1,000 MG CAPSULE    Garlic 1000 MG Oral Capsule   Refills: 0       Active    INSULIN ASPART (NOVOLOG FLEXPEN U-100 INSULIN) 100 UNIT/ML (3 ML) PEN    Inject 30 Units under the skin 3 times a day before meals.    INSULIN GLARGINE (BASAGLAR KWIKPEN U-100 INSULIN) 100 UNIT/ML (3 ML) PEN    40 Units.    IRBESARTAN (AVAPRO) 300 MG TABLET    Take 1 tablet (300 mg) by mouth once every day.    ISOSORBIDE MONONITRATE ER (IMDUR) 60 MG 24 HR TABLET    Take 1 tablet (60 mg) by mouth once daily in the morning.    LATANOPROST (XALATAN) 0.005 % OPHTHALMIC SOLUTION    Latanoprost 0.005 % Ophthalmic Solution   Quantity: 8  Refills: 0        Start : 23-May-2022   Active    LIRAGLUTIDE (VICTOZA) 0.6 MG/0.1 ML (18 MG/3 ML) INJECTION    Inject 0.3 mL (1.8 mg) under the skin once daily.    LORATADINE (CLARITIN LIQUI-GEL) 10 MG CAPSULE    Claritin 10 MG Oral Capsule   Refills: 0       Active    LOSARTAN (COZAAR) 50 MG TABLET        MAGNESIUM OXIDE (MAG-OX) 400 MG (241.3 MG MAGNESIUM) TABLET    Take by mouth.    MAGNESIUM OXIDE 240 MG MAGNESIUM POWDER IN PACKET    Take by mouth.    MELOXICAM (MOBIC) 7.5 MG TABLET    Take 1 tablet (7.5 mg) by mouth once daily.    METHOCARBAMOL (ROBAXIN) 500 MG TABLET    Take 1 tablet (500 mg) by mouth 3 times a day as needed for muscle spasms.    METOPROLOL SUCCINATE XL (TOPROL-XL) 50 MG 24 HR TABLET    Take 1 tablet (50 mg) by mouth once every day.    MOMETASONE (NASONEX) 50 MCG/ACTUATION NASAL SPRAY    Administer 1 spray into affected nostril(s).    MULTIVITAMIN TABLET    Multivitamins TABS   Refills: 0       Active    NITROGLYCERIN (NITROSTAT) 0.4 MG SL TABLET    Place 1 tablet under the tongue every 5 minutes for up to 3 doses as needed for chest pain. Call 911 if pain persists    NON FORMULARY    VELTRIX    OMEGA-3 1,000 MG CAPSULE CAPSULE    Take 5 capsules (5,000 mg) by mouth once daily.    OMEPRAZOLE (PRILOSEC) 20 MG DR CAPSULE    Take 1 capsule (20 mg) by  mouth once daily.    OXYGEN (O2) THERAPY    Inhale 2 L/min at 120,000 mL/hr. AT NIGHT.  OXYGEN LIQUID    PANTOPRAZOLE (PROTONIX) 40 MG EC TABLET    TAKE 1 TABLET BY MOUTH EVERY DAY    RIVAROXABAN (XARELTO) 20 MG TABLET    1 tablet (20 mg) once daily at bedtime.    SAW PALMETTO 500 MG CAPSULE    Saw Palmetto 500 MG Oral Capsule   Refills: 0       Active    SEMAGLUTIDE (OZEMPIC) 0.25 MG OR 0.5 MG (2 MG/3 ML) PEN INJECTOR    Inject 0.25 mg under the skin 1 (one) time per week. 0.25 mg weekly x 4 weeks then increase to 0.50 mg weekly    SPIRONOLACTON-HYDROCHLOROTHIAZ (ALDACTAZIDE) 25-25 MG TABLET    Take 1 tablet (25 mg) by mouth once daily.    TERBINAFINE (LAMISIL) 250 MG TABLET    Take 1 tablet (250 mg) by mouth once daily.    TRAMADOL (ULTRAM) 50 MG TABLET    Take 1 tablet (50 mg) by mouth every 6 hours.    TRIAMCINOLONE (KENALOG) 0.1 % CREAM    APPLY TO AFFECTED AREA ONCE DAILY AS NEEDED FOR RASH/ITCHING *30DS* *GIVE MD OFFICE A CALL*   Modified Medications    No medications on file   Discontinued Medications    No medications on file      The patient’s home medications have been reviewed.    Allergies: Iodinated contrast media, Cyclobenzaprine, Epinephrine, Metformin, Midazolam, Nizatidine, Oxycodone-acetaminophen, Shellfish derived, Terazosin, Adhesive, Amoxicillin-pot clavulanate, and Indomethacin    -------------------------------------------------- RESULTS -------------------------------------------------  All laboratory and radiology results have been personally reviewed by myself   LABS:  Labs Reviewed   CBC WITH AUTO DIFFERENTIAL - Abnormal       Result Value    WBC 6.1      nRBC 0.0      RBC 3.65 (*)     Hemoglobin 11.1 (*)     Hematocrit 33.8 (*)     MCV 93      MCH 30.4      MCHC 32.8      RDW 13.3      Platelets 161      Neutrophils % 58.0      Immature Granulocytes %, Automated 0.5      Lymphocytes % 28.6      Monocytes % 8.1      Eosinophils % 3.8      Basophils % 1.0      Neutrophils Absolute 3.53    "   Immature Granulocytes Absolute, Automated 0.03      Lymphocytes Absolute 1.74      Monocytes Absolute 0.49      Eosinophils Absolute 0.23      Basophils Absolute 0.06     PROTIME-INR - Abnormal    Protime 20.2 (*)     INR 1.8 (*)    GREEN TOP         RADIOLOGY:  Interpreted by Radiologist.  XR femur right 2+ views   Final Result   No acute fracture or dislocation.        Large knee joint effusion.        MACRO:   None        Signed by: Maykel Camilo 8/22/2024 12:45 PM   Dictation workstation:   [id number]          No results found for this or any previous visit (from the past 4464 hour(s)).  ------------------------- NURSING NOTES AND VITALS REVIEWED ---------------------------   The nursing notes within the ED encounter and vital signs as below have been reviewed.   /74   Pulse 67   Temp 36.2 °C (97.2 °F)   Resp 18   Ht 1.753 m (5' 9\")   Wt 107 kg (236 lb)   SpO2 100%   BMI 34.85 kg/m²   Oxygen Saturation Interpretation: Normal      ---------------------------------------------------PHYSICAL EXAM--------------------------------------  Physical Exam   Constitutional/General: Alert,  well appearing, non toxic in NAD  Head: Normocephalic and atraumatic  Eyes: PERRL, EOMI, conjunctiva normal, sclera non icteric  Mouth: Oropharynx clear, handling secretions, no trismus, no asymmetry of the posterior oropharynx or uvular edema  Neck: Supple, full ROM, non tender to palpation in the midline, no stridor, no crepitus, no meningeal signs  Respiratory: Lungs clear to auscultation bilaterally, no wheezes, rales, or rhonchi. Not in respiratory distress  Cardiovascular:  Regular rate. Regular rhythm. No murmurs, gallops, or rubs. 2+ distal pulses  Chest: No chest wall tenderness  GI:  Abdomen Soft, Non tender, Non distended.  +BS. No organomegaly, no palpable masses,  No rebound, guarding, or rigidity.   Musculoskeletal: Moves all extremities x 4. Warm and well perfused, no clubbing, cyanosis, or edema. " Capillary refill <3 seconds, mild tenderness over the lateral superior hip region.  There is scant bruising this area there is also scant bruising over the anterior mid thigh.  There is no break in skin integrity.  There is no shortening of the leg.  There is no pain with standing.  Integument: skin warm and dry. No rashes.   Lymphatic: no lymphadenopathy noted  Neurologic: No focal deficits, symmetric strength 5/5 in the upper and lower extremities bilaterally  Psychiatric: Normal Affect    Procedures    ------------------------------ ED COURSE/MEDICAL DECISION MAKING----------------------  Diagnoses as of 08/22/24 1327   Soft tissue injury of right lower leg, initial encounter      Patient has bruising to his right lateral hip region and right anterior thigh.  This occurred when he twisted but did not fall injuring his leg.  He felt a pop I suspect he has a partial tear of his tensor fascia edna.  He will be discharged home with tramadol for pain.  He has been asked to apply ice packs and follow-up with his primary care doctor early next week for repeat evaluation and possible physical therapy.      Medical Decision Making:   Differential diagnosis:  []    Medical decision making  Patient is stable for outpatient management.  Criteria for admission or observation are not present.  Patient has needed support at home to be safe and cared for this condition.       I discussed the differential; results and discharge plan with the patient and/or family/friend/caregiver if present.  I emphasized the importance of follow-up with the physician I referred them to in the timeframe recommended.  I explained reasons for the patient to return to the Emergency Department. They agreed that if they feel their condition is worsening or if they have any other concern they should call 911 immediately for further assistance. I gave the patient an opportunity to ask all questions they had and answered all of them accordingly. They  understand return precautions and discharge instructions. The patient and/or family/friend/caregiver expressed understanding verbally and that they would comply.    RENÉE Castillo MD  Diagnoses as of 08/22/24 1327   Soft tissue injury of right lower leg, initial encounter      Counseling:   The emergency provider has spoken with the patient and discussed today’s results, in addition to providing specific details for the plan of care and counseling regarding the diagnosis and prognosis.  Questions are answered at this time and they are agreeable with the plan.      --------------------------------- IMPRESSION AND DISPOSITION ---------------------------------        IMPRESSION  No diagnosis found.    DISPOSITION  Disposition: Discharge to home  Patient condition is fair      Billing Provider Critical Care Time: 0 minutes     Sameer Castillo MD  08/22/24 0149

## 2024-08-22 NOTE — DISCHARGE INSTRUCTIONS
Tramadol for pain.    Rest your leg is much as possible for the next 3 to 5 days.    See your doctor early next week for recheck and possible physical therapy referral.    Return for worsening symptoms or concerns.

## 2024-09-02 ENCOUNTER — APPOINTMENT (OUTPATIENT)
Dept: RADIOLOGY | Facility: HOSPITAL | Age: 89
End: 2024-09-02
Payer: MEDICARE

## 2024-09-02 ENCOUNTER — HOSPITAL ENCOUNTER (EMERGENCY)
Facility: HOSPITAL | Age: 89
Discharge: HOME | End: 2024-09-02
Attending: FAMILY MEDICINE
Payer: MEDICARE

## 2024-09-02 VITALS
HEIGHT: 70 IN | SYSTOLIC BLOOD PRESSURE: 124 MMHG | DIASTOLIC BLOOD PRESSURE: 73 MMHG | TEMPERATURE: 96.7 F | HEART RATE: 81 BPM | OXYGEN SATURATION: 94 % | RESPIRATION RATE: 19 BRPM | WEIGHT: 225 LBS | BODY MASS INDEX: 32.21 KG/M2

## 2024-09-02 DIAGNOSIS — S00.91XA ABRASION OF HEAD, INITIAL ENCOUNTER: ICD-10-CM
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
## 2024-09-02 DIAGNOSIS — R04.0 BLEEDING FROM THE NOSE: ICD-10-CM

## 2024-09-02 PROCEDURE — 70486 CT MAXILLOFACIAL W/O DYE: CPT | Performed by: STUDENT IN AN ORGANIZED HEALTH CARE EDUCATION/TRAINING PROGRAM

## 2024-09-02 PROCEDURE — 76377 3D RENDER W/INTRP POSTPROCES: CPT

## 2024-09-02 PROCEDURE — 70450 CT HEAD/BRAIN W/O DYE: CPT

## 2024-09-02 PROCEDURE — 70450 CT HEAD/BRAIN W/O DYE: CPT | Performed by: STUDENT IN AN ORGANIZED HEALTH CARE EDUCATION/TRAINING PROGRAM

## 2024-09-02 PROCEDURE — 99285 EMERGENCY DEPT VISIT HI MDM: CPT | Mod: 25

## 2024-09-02 PROCEDURE — 70486 CT MAXILLOFACIAL W/O DYE: CPT | Mod: RCN

## 2024-09-02 PROCEDURE — 72125 CT NECK SPINE W/O DYE: CPT | Performed by: STUDENT IN AN ORGANIZED HEALTH CARE EDUCATION/TRAINING PROGRAM

## 2024-09-02 PROCEDURE — 76377 3D RENDER W/INTRP POSTPROCES: CPT | Performed by: STUDENT IN AN ORGANIZED HEALTH CARE EDUCATION/TRAINING PROGRAM

## 2024-09-02 PROCEDURE — 72125 CT NECK SPINE W/O DYE: CPT

## 2024-09-02 ASSESSMENT — PAIN SCALES - GENERAL: PAINLEVEL_OUTOF10: 4

## 2024-09-02 ASSESSMENT — COLUMBIA-SUICIDE SEVERITY RATING SCALE - C-SSRS
1. IN THE PAST MONTH, HAVE YOU WISHED YOU WERE DEAD OR WISHED YOU COULD GO TO SLEEP AND NOT WAKE UP?: NO
2. HAVE YOU ACTUALLY HAD ANY THOUGHTS OF KILLING YOURSELF?: NO
6. HAVE YOU EVER DONE ANYTHING, STARTED TO DO ANYTHING, OR PREPARED TO DO ANYTHING TO END YOUR LIFE?: NO

## 2024-09-02 ASSESSMENT — PAIN - FUNCTIONAL ASSESSMENT: PAIN_FUNCTIONAL_ASSESSMENT: 0-10

## 2024-09-02 NOTE — ED PROVIDER NOTES
HPI   Chief Complaint   Patient presents with    Fall     Trama for fall on thinners       89-year-old male with history of A-fib, diabetes, neuropathy, hyperlipidemia, hypertension, and sleep apnea comes the ED with complaint of fall that occurred just prior to arrival.  According the patient he was working outside when he lost his balance walking for and fell face first into a wall.  Patient reports he had no LOC or confusion and family responded within a few seconds of the injury.  Patient reports he had some pain across his forehead and along the bridge of his nose where he had a small nosebleed.  Patient attempted to control the nosebleeding and told family who brought him to the ED for evaluation.  Patient states he is on blood thinners for his A-fib but has not yet taken it tonight.  Patient in the ED is alert, cooperative, appears comfortable, and in no distress.  Patient reports again only some mild pain along his forehead and his nose bleeding has stopped since arrival to the ED.  Patient reports no other associate symptoms or complaints.      History provided by:  Patient, medical records and relative   used: No            Patient History   Past Medical History:   Diagnosis Date    Atrial fibrillation, unspecified type (Multi)     Diabetes (Multi)     type 2    Diabetic polyneuropathy associated with type 2 diabetes mellitus (Multi)     Dyslipidemia     Elevated cholesterol     Essential (primary) hypertension     Hypertension     Macular degeneration     Mixed hyperlipidemia     Neuropathy     Primary hypertension     Retinopathy     Sleep apnea     Type 2 diabetes mellitus with diabetic polyneuropathy, with long-term current use of insulin (Multi)      Past Surgical History:   Procedure Laterality Date    BACK SURGERY      HERNIA REPAIR  07/17/2013    Hernia Repair Inguinal Bilateral    KNEE SURGERY Left     OTHER SURGICAL HISTORY  07/17/2013    Cath Stent Placement Number Of Stents  Placed:    OTHER SURGICAL HISTORY  2013    [place] Holes Without Other Surgery    ROTATOR CUFF REPAIR      TOTAL HIP ARTHROPLASTY Right 2014    Total Hip Replacement     Family History   Problem Relation Name Age of Onset    Stroke Mother      Heart disease Mother      Stroke Father      Cancer Sister      Diabetes Sister      Other (Systemic Lupus Erythematosus) Sister      Other (cardiac arrest) Brother      Other (stroke syndrome) Brother       Social History     Tobacco Use    Smoking status: Former     Current packs/day: 0.00     Types: Cigarettes     Quit date: 1968     Years since quittin.7     Passive exposure: Past    Smokeless tobacco: Never   Vaping Use    Vaping status: Never Used   Substance Use Topics    Alcohol use: Yes    Drug use: Not Currently       Physical Exam   ED Triage Vitals   Temperature Heart Rate Respirations BP   24 1733 24 1733 24 1733 24 1730   35.9 °C (96.7 °F) 81 19 156/87      SpO2 Temp Source Heart Rate Source Patient Position   24 1730 24 1733 -- --   100 % Temporal        BP Location FiO2 (%)     -- --             Physical Exam  Vitals and nursing note reviewed.   Constitutional:       General: He is not in acute distress.     Appearance: He is well-developed.   HENT:      Head: Normocephalic. Abrasion present.      Jaw: There is normal jaw occlusion.        Nose:      Right Nostril: Epistaxis present. No septal hematoma.      Left Nostril: Epistaxis present. No septal hematoma.      Comments: Dried blood in both naris with no active bleeding     Mouth/Throat:      Lips: Pink.      Mouth: Mucous membranes are moist.      Pharynx: Oropharynx is clear. Uvula midline.   Eyes:      Conjunctiva/sclera: Conjunctivae normal.   Neck:      Trachea: Trachea and phonation normal.   Cardiovascular:      Rate and Rhythm: Normal rate and regular rhythm.      Pulses: Normal pulses.      Heart sounds: Normal heart sounds, S1 normal and S2 normal.  No murmur heard.  Pulmonary:      Effort: Pulmonary effort is normal. No respiratory distress.      Breath sounds: Normal breath sounds.   Abdominal:      Palpations: Abdomen is soft.      Tenderness: There is no abdominal tenderness.   Musculoskeletal:         General: No swelling.      Cervical back: Full passive range of motion without pain, normal range of motion and neck supple.   Skin:     General: Skin is warm and dry.      Capillary Refill: Capillary refill takes less than 2 seconds.   Neurological:      General: No focal deficit present.      Mental Status: He is alert and oriented to person, place, and time.      GCS: GCS eye subscore is 4. GCS verbal subscore is 5. GCS motor subscore is 6.      Cranial Nerves: Cranial nerves 2-12 are intact.      Sensory: Sensation is intact.      Motor: Motor function is intact.      Coordination: Coordination is intact.      Gait: Gait is intact.      Comments: NIH 0  Ambulated well around the ED with no difficulties   Psychiatric:         Mood and Affect: Mood normal.           ED Course & MDM   Diagnoses as of 09/02/24 1946   Fall, initial encounter   Abrasion of head, initial encounter   Bleeding from the nose                 No data recorded     Alf Coma Scale Score: 15 (09/02/24 1734 : Natasha Lockwood RN)                         CT cervical spine wo IV contrast   Final Result   CT HEAD:   1. No acute intracranial abnormality or calvarial fracture.             CT MAXILLOFACIAL SKELETON:   1. No acute facial bone fracture.             CT CERVICAL SPINE:   1. No acute fracture or traumatic malalignment of the cervical spine.   2. 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        MACRO:   None.        Signed by: Ricardo Garrett 9/2/2024 6:31 PM   Dictation  workstation:   [id number]      CT maxillofacial bones wo IV contrast   Final Result   CT HEAD:   1. No acute intracranial abnormality or calvarial fracture.             CT MAXILLOFACIAL SKELETON:   1. No acute facial bone fracture.             CT CERVICAL SPINE:   1. No acute fracture or traumatic malalignment of the cervical spine.   2. 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        MACRO:   None.        Signed by: Ricardo Garrett 9/2/2024 6:31 PM   Dictation workstation:   [id number]      CT 3D reconstruction   Final Result      CT head wo IV contrast   Final Result   CT HEAD:   1. No acute intracranial abnormality or calvarial fracture.             CT MAXILLOFACIAL SKELETON:   1. No acute facial bone fracture.             CT CERVICAL SPINE:   1. No acute fracture or traumatic malalignment of the cervical spine.   2. 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        MACRO:   None.        Signed by: Ricardo Garrett 9/2/2024 6:31 PM   Dictation workstation:   [id number]          Medical Decision Making  Patient on arrival to the ED appeared to be anxious but comfortable and in no distress with stable vital signs.  Discussed with patient/family present complaints clinical findings.  Reviewed with them patient's epic chart and counseled them on injury status post falls and appropriate approach to management/treatments.  After assessment and evaluation patient was placed in a fall precautions, placed on cardiac monitor, imaging ordered, and patient observed.  Patient's nares were assessed and  cleaned and no active bleeding was noted.  Throughout stay in the ED patient continued be alert and oriented x 3 and neurologic appropriate at baseline as verified by family at bedside.  Patient continued with no new physical complaints and vital signs were stable throughout.  Final results were reviewed discussed with patient/family and no active concern was noted from the trauma.  Patient was observed for several hours in the ED and continued to have no change in neurological status or baseline per family.  Discussion was had with patient and family regarding postconcussion like symptoms and need for follow-up with her primary care doctor.  At this time patient is stable and discharged home with family.    Amount and/or Complexity of Data Reviewed  External Data Reviewed: labs, radiology, ECG and notes.  Radiology: ordered. Decision-making details documented in ED Course.    Risk  OTC drugs.        Procedure  Procedures     Agustin Acosta MD  09/02/24 1958

## 2024-09-16 ENCOUNTER — APPOINTMENT (OUTPATIENT)
Dept: UROLOGY | Facility: CLINIC | Age: 89
End: 2024-09-16
Payer: MEDICARE

## 2024-09-16 VITALS
DIASTOLIC BLOOD PRESSURE: 82 MMHG | HEART RATE: 80 BPM | TEMPERATURE: 97.1 F | HEIGHT: 70 IN | SYSTOLIC BLOOD PRESSURE: 162 MMHG | BODY MASS INDEX: 32.21 KG/M2 | WEIGHT: 225 LBS

## 2024-09-16 DIAGNOSIS — N13.8 BENIGN PROSTATIC HYPERPLASIA WITH URINARY OBSTRUCTION: Primary | ICD-10-CM

## 2024-09-16 DIAGNOSIS — I86.8 BLEEDING PROSTATE VARICES: ICD-10-CM

## 2024-09-16 DIAGNOSIS — R31.0 GROSS HEMATURIA: ICD-10-CM

## 2024-09-16 DIAGNOSIS — N40.1 BENIGN PROSTATIC HYPERPLASIA WITH URINARY OBSTRUCTION: Primary | ICD-10-CM

## 2024-09-16 LAB
POC APPEARANCE, URINE: CLEAR
POC BILIRUBIN, URINE: NEGATIVE
POC BLOOD, URINE: ABNORMAL
POC COLOR, URINE: YELLOW
POC GLUCOSE, URINE: NEGATIVE MG/DL
POC KETONES, URINE: NEGATIVE MG/DL
POC LEUKOCYTES, URINE: NEGATIVE
POC NITRITE,URINE: NEGATIVE
POC PH, URINE: 6 PH
POC PROTEIN, URINE: NEGATIVE MG/DL
POC SPECIFIC GRAVITY, URINE: 1.01
POC UROBILINOGEN, URINE: 0.2 EU/DL

## 2024-09-16 PROCEDURE — 87086 URINE CULTURE/COLONY COUNT: CPT

## 2024-09-16 PROCEDURE — 81003 URINALYSIS AUTO W/O SCOPE: CPT | Performed by: UROLOGY

## 2024-09-16 PROCEDURE — 52000 CYSTOURETHROSCOPY: CPT | Performed by: UROLOGY

## 2024-09-16 PROCEDURE — 99213 OFFICE O/P EST LOW 20 MIN: CPT | Performed by: UROLOGY

## 2024-09-16 PROCEDURE — G2211 COMPLEX E/M VISIT ADD ON: HCPCS | Performed by: UROLOGY

## 2024-09-16 ASSESSMENT — PAIN SCALES - GENERAL: PAINLEVEL: 0-NO PAIN

## 2024-09-16 NOTE — PROGRESS NOTES
"Is  Patient is a 89 y.o. male presents with gross hematuria.      SUBJECTIVE:  HPI   He had gross hematuria.  Renal ultrasound was okay.    He has a good flow.  He takes saw palmetto.  He is on plavix and Mobic.      No results found for: \"KPSAT\", \"KPSAP\"  No results found for: \"UAMICCOMM\"  No results found for: \"TR1\"  No results found for: \"URINECULTURE\"     Past Medical History:   Diagnosis Date    Atrial fibrillation, unspecified type (Multi)     Diabetes (Multi)     type 2    Diabetic polyneuropathy associated with type 2 diabetes mellitus (Multi)     Dyslipidemia     Elevated cholesterol     Essential (primary) hypertension     Hypertension     Macular degeneration     Mixed hyperlipidemia     Neuropathy     Primary hypertension     Retinopathy     Sleep apnea     Type 2 diabetes mellitus with diabetic polyneuropathy, with long-term current use of insulin (Multi)       Past Surgical History:   Procedure Laterality Date    BACK SURGERY      HERNIA REPAIR  2013    Hernia Repair Inguinal Bilateral    KNEE SURGERY Left     OTHER SURGICAL HISTORY  2013    Cath Stent Placement Number Of Stents Placed:    OTHER SURGICAL HISTORY  2013    Brandon Holes Without Other Surgery    ROTATOR CUFF REPAIR      TOTAL HIP ARTHROPLASTY Right 2014    Total Hip Replacement      Family History   Problem Relation Name Age of Onset    Stroke Mother      Heart disease Mother      Stroke Father      Cancer Sister      Diabetes Sister      Other (Systemic Lupus Erythematosus) Sister      Other (cardiac arrest) Brother      Other (stroke syndrome) Brother        Social History     Socioeconomic History    Marital status:    Tobacco Use    Smoking status: Former     Current packs/day: 0.00     Types: Cigarettes     Quit date:      Years since quittin.7     Passive exposure: Past    Smokeless tobacco: Never   Vaping Use    Vaping status: Never Used   Substance and Sexual Activity    Alcohol use: Yes    " Drug use: Not Currently    Sexual activity: Not Currently        Review of Systems   Constitutional: denies any unintentional weight loss or change in strength.  Integumentary: denies any rashes or pruritus.  Eyes: denies any double vision or eye pain.  Ear/Nose/Mouth/Throat: denies any nosebleeds or gum bleeds.  Cardiovascular: denies any chest pain or syncope.  Respiratory: denies hemoptysis.  Gastrointestinal: denies nausea or vomiting.  Musculoskeletal: denies muscle cramping or weakness.  Neurologic: denies convulsions or seizures.  Hematologic/Lymphatic: denies bleeding tendencies.  Endocrine: denies heat/cold intolerance.  All other systems have been reviewed and are negative unless otherwise noted in the HPI.    OBJECTIVE:  Visit Vitals  /82 (BP Location: Left arm, Patient Position: Sitting, BP Cuff Size: Adult)   Pulse 80   Temp 36.2 °C (97.1 °F)     Physical Exam   Constitutional: No obvious distress.  Eyes: Non-injected conjunctiva, sclera clear, EOMI.  Ears/Nose/Mouth/Throat: No obvious drainage per ears or nose.  Cardiovascular: Extremities are warm and well perfused. No edema, cyanosis or pallor.  Respiratory: No audible wheezing/stridor; respirations do not appear labored.  Gastrointestinal: Abdomen soft, not distended.  Musculoskeletal: Normal ROM of extremities.  Skin: No obvious rashes or open sores.  Neurologic: Alert and oriented, CN 2-12 grossly intact.  Psychiatric: Answers questions appropriately with normal affect.  Hematologic/Lymphatic/Immunologic: No obvious bruises or sites of spontaneous bleeding.  Genitourinary: No CVA tenderness, bladder not palpable.     Labs:  Lab Results   Component Value Date    WBC 6.1 08/22/2024    HGB 11.1 (L) 08/22/2024    HCT 33.8 (L) 08/22/2024     08/22/2024    CHOL 112 08/19/2022    TRIG 261 (H) 08/19/2022    HDL 30.6 (A) 08/19/2022    LDLDIRECT 37 08/19/2022    ALT 21 04/17/2023    AST 31 04/17/2023     04/17/2023    K 4.0 04/17/2023     " 04/17/2023    CREATININE 1.07 04/17/2023    BUN 21 04/17/2023    CO2 28 04/17/2023    TSH 1.70 04/17/2023    INR 1.8 (H) 08/22/2024    HGBA1C 7.3 (A) 02/23/2024     No results found for: \"KPSAT\", \"KPSAP\"  IMAGING:        PROCEDURES:    Cystoscopy    Date/Time: 9/16/2024 10:15 AM    Performed by: Kelechi Marie MD  Authorized by: Kelechi Marie MD      Comments:      No bladder tumors or stones identified.  He had enlarged lateral lobes the prostate      He had bleeding from the prostate median lobe.    ASSESSMENT/PLAN:  Problem List Items Addressed This Visit       Benign prostatic hyperplasia with urinary obstruction - Primary    Bleeding prostate varices    Gross hematuria    Relevant Orders    POCT UA Automated manually resulted (Completed)      He had gross hematuria.  He had a CT without contrast in 2/2024 which identified a complex left renal cyst with a calcification, bladder thickening and an enlarged prostate.   Cystoscopy was negative today other than a vascular prostate.  He had some hematuria today.    We discussed treatment for prostate bleeding including fulguration and finasteride.  He will monitor at this time.    He has BPH,  He is taking saw palmetto.  Monitor voiding.    He has difficulty voiding today due to hematuria, he will return to the office.  He received a dose of Cipro today.  Godfrey  He will follow up in 3 months,.    All questions were answered to the patient’s satisfaction.  Patient agrees with the plan and wishes to proceed.  Follow-up will be scheduled appropriately.     Kelechi Marie MD  "

## 2024-09-18 LAB — BACTERIA UR CULT: NORMAL

## 2024-10-03 ENCOUNTER — APPOINTMENT (OUTPATIENT)
Dept: ENDOCRINOLOGY | Facility: CLINIC | Age: 89
End: 2024-10-03
Payer: MEDICARE

## 2024-10-03 VITALS
BODY MASS INDEX: 32.45 KG/M2 | HEIGHT: 71 IN | WEIGHT: 231.8 LBS | HEART RATE: 58 BPM | SYSTOLIC BLOOD PRESSURE: 139 MMHG | DIASTOLIC BLOOD PRESSURE: 62 MMHG

## 2024-10-03 DIAGNOSIS — Z79.4 CONTROLLED TYPE 2 DIABETES MELLITUS WITHOUT COMPLICATION, WITH LONG-TERM CURRENT USE OF INSULIN (MULTI): ICD-10-CM

## 2024-10-03 DIAGNOSIS — Z79.4 CONTROLLED TYPE 2 DIABETES MELLITUS WITHOUT COMPLICATION, WITH LONG-TERM CURRENT USE OF INSULIN (MULTI): Primary | ICD-10-CM

## 2024-10-03 DIAGNOSIS — E78.2 MIXED HYPERLIPIDEMIA: ICD-10-CM

## 2024-10-03 DIAGNOSIS — E11.9 CONTROLLED TYPE 2 DIABETES MELLITUS WITHOUT COMPLICATION, WITH LONG-TERM CURRENT USE OF INSULIN (MULTI): Primary | ICD-10-CM

## 2024-10-03 DIAGNOSIS — E11.9 CONTROLLED TYPE 2 DIABETES MELLITUS WITHOUT COMPLICATION, WITH LONG-TERM CURRENT USE OF INSULIN (MULTI): ICD-10-CM

## 2024-10-03 DIAGNOSIS — I10 PRIMARY HYPERTENSION: ICD-10-CM

## 2024-10-03 LAB — POC HEMOGLOBIN A1C: 7.6 % (ref 4.2–6.5)

## 2024-10-03 PROCEDURE — 1159F MED LIST DOCD IN RCRD: CPT | Performed by: NURSE PRACTITIONER

## 2024-10-03 PROCEDURE — 1126F AMNT PAIN NOTED NONE PRSNT: CPT | Performed by: NURSE PRACTITIONER

## 2024-10-03 PROCEDURE — 3078F DIAST BP <80 MM HG: CPT | Performed by: NURSE PRACTITIONER

## 2024-10-03 PROCEDURE — 1036F TOBACCO NON-USER: CPT | Performed by: NURSE PRACTITIONER

## 2024-10-03 PROCEDURE — 99213 OFFICE O/P EST LOW 20 MIN: CPT | Performed by: NURSE PRACTITIONER

## 2024-10-03 PROCEDURE — 3075F SYST BP GE 130 - 139MM HG: CPT | Performed by: NURSE PRACTITIONER

## 2024-10-03 PROCEDURE — 83036 HEMOGLOBIN GLYCOSYLATED A1C: CPT | Performed by: NURSE PRACTITIONER

## 2024-10-03 RX ORDER — SEMAGLUTIDE 0.68 MG/ML
0.25 INJECTION, SOLUTION SUBCUTANEOUS
Qty: 6 ML | Refills: 1 | Status: SHIPPED | OUTPATIENT
Start: 2024-10-06

## 2024-10-03 RX ORDER — SEMAGLUTIDE 0.68 MG/ML
0.25 INJECTION, SOLUTION SUBCUTANEOUS
Qty: 6 ML | Refills: 1 | Status: SHIPPED | OUTPATIENT
Start: 2024-10-06 | End: 2024-10-03 | Stop reason: SDUPTHER

## 2024-10-03 ASSESSMENT — PATIENT HEALTH QUESTIONNAIRE - PHQ9
2. FEELING DOWN, DEPRESSED OR HOPELESS: NOT AT ALL
SUM OF ALL RESPONSES TO PHQ9 QUESTIONS 1 & 2: 0
1. LITTLE INTEREST OR PLEASURE IN DOING THINGS: NOT AT ALL

## 2024-10-03 ASSESSMENT — LIFESTYLE VARIABLES
HOW OFTEN DO YOU HAVE SIX OR MORE DRINKS ON ONE OCCASION: NEVER
HOW OFTEN DO YOU HAVE A DRINK CONTAINING ALCOHOL: 2-4 TIMES A MONTH
AUDIT-C TOTAL SCORE: 2
HOW MANY STANDARD DRINKS CONTAINING ALCOHOL DO YOU HAVE ON A TYPICAL DAY: 1 OR 2
SKIP TO QUESTIONS 9-10: 1

## 2024-10-03 ASSESSMENT — ENCOUNTER SYMPTOMS
DEPRESSION: 0
LOSS OF SENSATION IN FEET: 0
OCCASIONAL FEELINGS OF UNSTEADINESS: 0

## 2024-10-03 ASSESSMENT — PAIN SCALES - GENERAL: PAINLEVEL: 0-NO PAIN

## 2024-10-03 NOTE — PROGRESS NOTES
HPI   88 yo with Diabetes 2 (neuropathy/retinopathy), HTN, Dyslipidemia, karli presents for followup. A1C today 7.6% was 7.3%. Pt is testing sugars 4 times per day. Pt is having low sugars 0 times/week. Pt's typical blood sugars are running under 140 AM under 200 before bed. Pt is following a carb controlled diet and knows reasonable carb allowances. Pt is able to afford their medications. Pt is exercising.           -INSULIN Lantus 45 units, Novolog 30 units, isf-30 over 150, Victoza wants to try Ozempic again if covered          -STATIN Atorvastatin 40 mg tolerating LDL          -on several bp/cv meds, see below         -taking amiodarone since spring 2023 (TSH 6 months)    90 day surjit 2 data: 65% in range, 1% low, pattern: mid 100's overnight, mid/upper 100's through the day.      Current Outpatient Medications:     amiodarone (Pacerone) 200 mg tablet, Take 1 tablet (200 mg) by mouth once daily., Disp: , Rfl:     amLODIPine (Norvasc) 10 mg tablet, Take by mouth once daily., Disp: , Rfl:     aspirin 81 mg EC tablet, 1 tablet (81 mg) 1 time., Disp: , Rfl:     atenolol (Tenormin) 25 mg tablet, Take by mouth once daily., Disp: , Rfl:     atorvastatin (Lipitor) 40 mg tablet, Take 1 tablet (40 mg) by mouth once daily., Disp: , Rfl:     blood sugar diagnostic strip, Per endo, Disp: , Rfl:     brimonidine-timoloL (Combigan) 0.2-0.5 % ophthalmic solution, , Disp: , Rfl:     clopidogrel (Plavix) 75 mg tablet, , Disp: , Rfl:     cyanocobalamin (Vitamin B-12) 1,000 mcg/mL injection, Inject 1 mL (1,000 mcg) into the muscle every 30 (thirty) days., Disp: , Rfl:     fish oil concentrate (Omega-3) 120-180 mg capsule, Take 1 capsule (1 g) by mouth., Disp: , Rfl:     fluocinonide 0.05 % cream, Apply 1 Application topically once daily as needed for rash (itch)., Disp: , Rfl:     FLUoxetine (PROzac) 20 mg capsule, Take 1 capsule (20 mg) by mouth once daily., Disp: , Rfl:     fluticasone (Flonase) 50 mcg/actuation nasal spray,  Administer 2 sprays into each nostril once daily in the morning., Disp: , Rfl:     FreeStyle Junior 2 Sensor kit, USE 1 SENSOR AS DIRECTED EVERY 14 DAYS, E11.65 ON INSULIN, Disp: , Rfl:     furosemide (Lasix) 40 mg tablet, Take 1 tablet (40 mg) by mouth once every day., Disp: , Rfl:     gabapentin (Neurontin) 600 mg tablet, Take 1 tablet (600 mg) by mouth 3 times a day., Disp: , Rfl:     garlic 1,000 mg capsule, Garlic 1000 MG Oral Capsule  Refills: 0     Active, Disp: , Rfl:     insulin aspart (NovoLOG FlexPen U-100 Insulin) 100 unit/mL (3 mL) pen, Inject 30 Units under the skin 3 times a day before meals., Disp: , Rfl:     insulin glargine (Basaglar KwikPen U-100 Insulin) 100 unit/mL (3 mL) pen, 40 Units., Disp: , Rfl:     irbesartan (Avapro) 300 mg tablet, Take 1 tablet (300 mg) by mouth once every day., Disp: , Rfl:     isosorbide mononitrate ER (Imdur) 60 mg 24 hr tablet, Take 1 tablet (60 mg) by mouth once daily in the morning., Disp: , Rfl:     latanoprost (Xalatan) 0.005 % ophthalmic solution, Latanoprost 0.005 % Ophthalmic Solution  Quantity: 8  Refills: 0      Start : 23-May-2022  Active, Disp: , Rfl:     liraglutide (Victoza) 0.6 mg/0.1 mL (18 mg/3 mL) injection, Inject 0.3 mL (1.8 mg) under the skin once daily., Disp: , Rfl:     losartan (Cozaar) 50 mg tablet, , Disp: , Rfl:     magnesium oxide (Mag-Ox) 400 mg (241.3 mg magnesium) tablet, Take by mouth., Disp: , Rfl:     magnesium oxide 240 mg magnesium powder in packet, Take by mouth., Disp: , Rfl:     meloxicam (Mobic) 7.5 mg tablet, Take 1 tablet (7.5 mg) by mouth once daily., Disp: 90 tablet, Rfl: 3    metoprolol succinate XL (Toprol-XL) 50 mg 24 hr tablet, Take 1 tablet (50 mg) by mouth once every day., Disp: , Rfl:     mometasone (Nasonex) 50 mcg/actuation nasal spray, Administer 1 spray into affected nostril(s)., Disp: , Rfl:     multivitamin tablet, Multivitamins TABS  Refills: 0     Active, Disp: , Rfl:     nitroglycerin (Nitrostat) 0.4 mg SL  tablet, Place 1 tablet under the tongue every 5 minutes for up to 3 doses as needed for chest pain. Call 911 if pain persists, Disp: , Rfl:     NON FORMULARY, VELTRIX, Disp: , Rfl:     omega-3 1,000 mg capsule capsule, Take 5 capsules (5,000 mg) by mouth once daily., Disp: , Rfl:     omeprazole (PriLOSEC) 20 mg DR capsule, Take 1 capsule (20 mg) by mouth once daily., Disp: , Rfl:     oxygen (O2) therapy, Inhale 2 L/min at 120,000 mL/hr. AT NIGHT.  OXYGEN LIQUID, Disp: , Rfl:     pantoprazole (ProtoNix) 40 mg EC tablet, TAKE 1 TABLET BY MOUTH EVERY DAY, Disp: 90 tablet, Rfl: 3    rivaroxaban (Xarelto) 20 mg tablet, 1 tablet (20 mg) once daily at bedtime., Disp: , Rfl:     saw palmetto 500 mg capsule, Saw Palmetto 500 MG Oral Capsule  Refills: 0     Active, Disp: , Rfl:     semaglutide (Ozempic) 0.25 mg or 0.5 mg (2 mg/3 mL) pen injector, Inject 0.25 mg under the skin 1 (one) time per week. 0.25 mg weekly x 4 weeks then increase to 0.50 mg weekly, Disp: 6 mL, Rfl: 1    spironolacton-hydrochlorothiaz (Aldactazide) 25-25 mg tablet, Take 1 tablet (25 mg) by mouth once daily., Disp: , Rfl:     terbinafine (LamISIL) 250 mg tablet, Take 1 tablet (250 mg) by mouth once daily., Disp: , Rfl:     traMADol (Ultram) 50 mg tablet, Take 1 tablet (50 mg) by mouth every 6 hours., Disp: , Rfl:     triamcinolone (Kenalog) 0.1 % cream, APPLY TO AFFECTED AREA ONCE DAILY AS NEEDED FOR RASH/ITCHING *30DS* *GIVE MD OFFICE A CALL*, Disp: , Rfl:     loratadine (Claritin Liqui-Gel) 10 mg capsule, Claritin 10 MG Oral Capsule  Refills: 0     Active, Disp: , Rfl:     methocarbamol (Robaxin) 500 mg tablet, Take 1 tablet (500 mg) by mouth 3 times a day as needed for muscle spasms., Disp: 40 tablet, Rfl: 1      Allergies as of 10/03/2024 - Reviewed 10/03/2024   Allergen Reaction Noted    Iodinated contrast media Anaphylaxis, Unknown, and Other 11/06/2006    Cyclobenzaprine Unknown 04/06/2023    Epinephrine Unknown 04/06/2023    Metformin Diarrhea  "03/03/2020    Midazolam Unknown 04/06/2023    Nizatidine Unknown 08/19/2012    Oxycodone-acetaminophen Unknown 04/06/2023    Shellfish derived Unknown 08/02/2019    Terazosin Unknown 08/19/2012    Adhesive Rash 06/21/2013    Amoxicillin-pot clavulanate Unknown, Rash, Hives, and Other 11/30/2018    Indomethacin Other, Unknown, and Rash 11/21/2005         Review of Systems   Cardiology: Lightheadedness-denies.  Chest pain-denies.  Leg edema-denies.  Palpitations-denies.  Respiratory: Cough-denies. Shortness of breath-denies.  Wheezing-denies.  Gastroenterology: Constipation-denies.  Diarrhea-denies.  Heartburn-denies.  Endocrinology: Cold intolerance-denies.  Heat intolerance-denies.  Sweats-denies.  Neurology: Headache-denies.  Tremor-denies.  Neuropathy in extremities-denies.  Psychology: Low energy-denies.  Irritability-denies.  Sleep disturbances-denies.      /62   Pulse 58   Ht 1.803 m (5' 11\")   Wt 105 kg (231 lb 12.8 oz)   BMI 32.33 kg/m²       Labs:  Lab Results   Component Value Date    WBC 6.1 08/22/2024    NRBC 0.0 08/22/2024    RBC 3.65 (L) 08/22/2024    HGB 11.1 (L) 08/22/2024    HCT 33.8 (L) 08/22/2024     08/22/2024     Lab Results   Component Value Date    CALCIUM 9.4 04/17/2023    AST 31 04/17/2023    ALKPHOS 37 04/17/2023    BILITOT 0.7 04/17/2023    PROT 7.7 04/17/2023    ALBUMIN 4.5 04/17/2023     04/17/2023    K 4.0 04/17/2023     04/17/2023    CO2 28 04/17/2023    ANIONGAP 13 04/17/2023    BUN 21 04/17/2023    CREATININE 1.07 04/17/2023    GLUCOSE 128 (H) 04/17/2023    ALT 21 04/17/2023     Lab Results   Component Value Date    CHOL 112 08/19/2022    TRIG 261 (H) 08/19/2022    HDL 30.6 (A) 08/19/2022     Lab Results   Component Value Date    TSH 1.70 04/17/2023     Lab Results   Component Value Date    RQSNYWLY10 610 09/21/2021     Lab Results   Component Value Date    HGBA1C 7.6 (A) 10/03/2024         Physical Exam   General Appearance: pleasant, cooperative, no " acute distress  HEENT: no chemosis, no proptosis, no lid lag, no lid retraction  Neck: no lymphadenopathy, no thyromegaly, no dominant thyroid nodules  Heart: murmur, irregular rate and rhythm, S1 and S2  Lungs: no wheezes, no rhonci, no rales  Extremities: no lower extremity swelling      Assessment/Plan   1. Controlled type 2 diabetes mellitus without complication, with long-term current use of insulin (CMS/[facility])  -would benefit from Ozempic over Victoza for CV and renal benefits, glycemic control and weight loss  -given written prescription to patient  -reviewed potential side effects with GLP1 medication  -will stop Victoza if Ozempic is approved  -reviewed target BS  -reviewed recommended carbs per meal/snack    - semaglutide (Ozempic) 0.25 mg or 0.5 mg (2 mg/3 mL) pen injector; Inject 0.25 mg under the skin 1 (one) time per week. 0.25 mg weekly x 4 weeks then increase to 0.50 mg weekly  Dispense: 6 mL; Refill: 1    2. Mixed hyperlipidemia  -tolerates statin  -LDL at target    3. Primary hypertension  -stable     Follow Up:  6 months    -labs/tests/notes reviewed  -reviewed and counseled patient on medication monitoring and side effects  Medical Decision Making  Complexity of problem: Chronic illness of diabetes mellitus uncontrolled, progressing  Data analyzed and reviewed: Reviewed prior notes, blood glucose data, labs including HgbA1c, lipids, serum chemistries.  Ordered tests.   Risk of complications and morbidities: Is definite because of use of insulin and risk of hypoglycemia.  Prescription medications reviewed and modifications made.  Compliance assessed.  Addressed social determinants of health including food insecurity.

## 2024-10-03 NOTE — TELEPHONE ENCOUNTER
William Mckinnon   1935   [id number]   [phone]       Called patient and left voice message in regards to medication needing PA.     We are sending your prescription to [facility] for benefits investigation and affordability support.      If you have any questions or would like to check the status of your prescription(s),  please contact the pharmacy directly at [phone].

## 2024-10-14 ENCOUNTER — OFFICE VISIT (OUTPATIENT)
Dept: URGENT CARE | Facility: URGENT CARE | Age: 89
End: 2024-10-14
Payer: MEDICARE

## 2024-10-14 VITALS
OXYGEN SATURATION: 96 % | SYSTOLIC BLOOD PRESSURE: 156 MMHG | HEIGHT: 70 IN | DIASTOLIC BLOOD PRESSURE: 89 MMHG | TEMPERATURE: 98.2 F | RESPIRATION RATE: 18 BRPM | WEIGHT: 235.89 LBS | BODY MASS INDEX: 33.77 KG/M2 | HEART RATE: 70 BPM

## 2024-10-14 DIAGNOSIS — S80.261A TICK BITE OF RIGHT KNEE, INITIAL ENCOUNTER: Primary | ICD-10-CM
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
## 2024-10-14 PROCEDURE — 99213 OFFICE O/P EST LOW 20 MIN: CPT | Performed by: PHYSICIAN ASSISTANT

## 2024-10-14 PROCEDURE — 3077F SYST BP >= 140 MM HG: CPT | Performed by: PHYSICIAN ASSISTANT

## 2024-10-14 PROCEDURE — 3079F DIAST BP 80-89 MM HG: CPT | Performed by: PHYSICIAN ASSISTANT

## 2024-10-14 RX ORDER — DOXYCYCLINE 100 MG/1
100 TABLET ORAL 2 TIMES DAILY
Qty: 20 TABLET | Refills: 0 | Status: SHIPPED | OUTPATIENT
Start: 2024-10-14 | End: 2024-10-24

## 2024-10-14 ASSESSMENT — ENCOUNTER SYMPTOMS
HEMATOLOGIC/LYMPHATIC NEGATIVE: 1
GASTROINTESTINAL NEGATIVE: 1
NEUROLOGICAL NEGATIVE: 1
PSYCHIATRIC NEGATIVE: 1
RESPIRATORY NEGATIVE: 1
CARDIOVASCULAR NEGATIVE: 1
EYES NEGATIVE: 1
CONSTITUTIONAL NEGATIVE: 1
MUSCULOSKELETAL NEGATIVE: 1

## 2024-10-14 NOTE — PROGRESS NOTES
"Subjective   Patient ID: William Mckinnon is a 89 y.o. male. They present today with a chief complaint of Other (Tick bite x 2days).    History of Present Illness  89-year-old male with significant past medical history including atrial fibs, CAD-3 stents placed w/ blocked LAD, aortic valve dysfunction diabetes hypertension noted a \"black spot\" to the right posterior knee area   He pulled the black spot off and noted that it was a tick had been embedded was not severely engorged    Denies any fevers chills no headaches no bodyaches no rash no neck pain  He did bring the \"live  tick\" into the urgent care with him in one of his pill bottles  He removed the tick last night          Past Medical History  Allergies as of 10/14/2024 - Reviewed 10/03/2024   Allergen Reaction Noted    Iodinated contrast media Anaphylaxis, Unknown, and Other 11/06/2006    Cyclobenzaprine Unknown 04/06/2023    Epinephrine Unknown 04/06/2023    Metformin Diarrhea 03/03/2020    Midazolam Unknown 04/06/2023    Nizatidine Unknown 08/19/2012    Oxycodone-acetaminophen Unknown 04/06/2023    Shellfish derived Unknown 08/02/2019    Terazosin Unknown 08/19/2012    Adhesive Rash 06/21/2013    Amoxicillin-pot clavulanate Unknown, Rash, Hives, and Other 11/30/2018    Indomethacin Other, Unknown, and Rash 11/21/2005       (Not in a hospital admission)       Past Medical History:   Diagnosis Date    Atrial fibrillation, unspecified type (Multi)     Diabetes (Multi)     type 2    Diabetic polyneuropathy associated with type 2 diabetes mellitus (Multi)     Dyslipidemia     Elevated cholesterol     Essential (primary) hypertension     Hypertension     Macular degeneration     Mixed hyperlipidemia     Neuropathy     Primary hypertension     Retinopathy     Sleep apnea     Type 2 diabetes mellitus with diabetic polyneuropathy, with long-term current use of insulin        Past Surgical History:   Procedure Laterality Date    BACK SURGERY      HERNIA REPAIR  " "07/17/2013    Hernia Repair Inguinal Bilateral    KNEE SURGERY Left     OTHER SURGICAL HISTORY  07/17/2013    Cath Stent Placement Number Of Stents Placed:    OTHER SURGICAL HISTORY  07/17/2013    Lisseth Holes Without Other Surgery    ROTATOR CUFF REPAIR      TOTAL HIP ARTHROPLASTY Right 08/28/2014    Total Hip Replacement        reports that he quit smoking about 56 years ago. His smoking use included cigarettes. He has been exposed to tobacco smoke. He has never used smokeless tobacco. He reports current alcohol use. He reports that he does not currently use drugs.    Review of Systems  Review of Systems   Constitutional: Negative.    HENT: Negative.     Eyes: Negative.    Respiratory: Negative.     Cardiovascular: Negative.    Gastrointestinal: Negative.    Genitourinary: Negative.    Musculoskeletal: Negative.    Skin: Negative.         Rash right posterior leg overlying area where tick had been embedded   Neurological: Negative.    Hematological: Negative.    Psychiatric/Behavioral: Negative.     All other systems reviewed and are negative.                                 Objective    Vitals:    10/14/24 1426   BP: 156/89   Pulse: 70   Resp: 18   Temp: 36.8 °C (98.2 °F)   TempSrc: Oral   SpO2: 96%   Weight: 107 kg (235 lb 14.3 oz)   Height: 1.778 m (5' 10\")     No LMP for male patient.    Physical Exam  Vitals and nursing note reviewed.   Constitutional:       Appearance: Normal appearance.   HENT:      Head: Normocephalic and atraumatic.      Nose: Nose normal.      Mouth/Throat:      Mouth: Mucous membranes are moist.   Eyes:      Extraocular Movements: Extraocular movements intact.      Pupils: Pupils are equal, round, and reactive to light.   Neck:      Vascular: No carotid bruit.   Cardiovascular:      Rate and Rhythm: Normal rate and regular rhythm.   Pulmonary:      Effort: Pulmonary effort is normal.      Breath sounds: Normal breath sounds.   Abdominal:      Palpations: Abdomen is soft. "   Musculoskeletal:         General: Normal range of motion.      Cervical back: Normal range of motion and neck supple. No rigidity or tenderness.   Lymphadenopathy:      Cervical: No cervical adenopathy.   Skin:     General: Skin is warm and dry.      Capillary Refill: Capillary refill takes less than 2 seconds.      Comments: Erythematous rash to the right posterior leg area is approximately 1 cm oval       Neurological:      General: No focal deficit present.      Mental Status: He is alert and oriented to person, place, and time.         Procedures    Point of Care Test & Imaging Results from this visit  No results found for this visit on 10/14/24.   No results found.    Diagnostic study results (if any) were reviewed by Dana Brown PA-C.    Assessment/Plan   Allergies, medications, history, and pertinent labs/EKGs/Imaging reviewed by Dana Brown PA-C.     Medical Decision Making  Tick embedded, Lyme's disease,  Did bring the live tick in it  was not engorged  Will prescribe oral antibiotics prophylactic, patient advised that if he develops fevers chills headache neck pain body aches worsening rash or any other symptoms he needs to get rechecked  But since it was not engorged did not appear to be on for very long we will give him the prophylactic dose of the antibiotic    Orders and Diagnoses  There are no diagnoses linked to this encounter.    Medical Admin Record      Patient disposition: Home    Electronically signed by Dana Brown PA-C  3:15 PM

## 2024-10-25 ENCOUNTER — TELEPHONE (OUTPATIENT)
Dept: ENDOCRINOLOGY | Facility: CLINIC | Age: 89
End: 2024-10-25
Payer: MEDICARE

## 2024-10-25 NOTE — TELEPHONE ENCOUNTER
William Mckinnon   1935   [id number]   [phone]       Patient called and left message in regards wanting to speaking with DE about when should he start taking Ozempic because he just started Victoza.   Can you please call patient in regards to this.

## 2024-10-31 ENCOUNTER — HOSPITAL ENCOUNTER (OUTPATIENT)
Dept: RADIOLOGY | Facility: CLINIC | Age: 89
Discharge: HOME | End: 2024-10-31
Payer: MEDICARE

## 2024-10-31 ENCOUNTER — OFFICE VISIT (OUTPATIENT)
Dept: URGENT CARE | Facility: URGENT CARE | Age: 89
End: 2024-10-31
Payer: MEDICARE

## 2024-10-31 VITALS
TEMPERATURE: 97 F | BODY MASS INDEX: 32.14 KG/M2 | SYSTOLIC BLOOD PRESSURE: 137 MMHG | RESPIRATION RATE: 16 BRPM | HEART RATE: 58 BPM | DIASTOLIC BLOOD PRESSURE: 66 MMHG | WEIGHT: 224 LBS | OXYGEN SATURATION: 96 %

## 2024-10-31 DIAGNOSIS — S79.911A HIP INJURY, RIGHT, INITIAL ENCOUNTER: ICD-10-CM

## 2024-10-31 DIAGNOSIS — S20.212A CONTUSION OF RIB ON LEFT SIDE, INITIAL ENCOUNTER: Primary | ICD-10-CM

## 2024-10-31 DIAGNOSIS — M62.830 MUSCLE SPASM OF BACK: ICD-10-CM

## 2024-10-31 DIAGNOSIS — S29.9XXA RIB INJURY: ICD-10-CM

## 2024-10-31 PROCEDURE — 99070 SPECIAL SUPPLIES PHYS/QHP: CPT | Performed by: PHYSICIAN ASSISTANT

## 2024-10-31 PROCEDURE — 73502 X-RAY EXAM HIP UNI 2-3 VIEWS: CPT | Mod: RT

## 2024-10-31 PROCEDURE — 1160F RVW MEDS BY RX/DR IN RCRD: CPT | Performed by: PHYSICIAN ASSISTANT

## 2024-10-31 PROCEDURE — 3078F DIAST BP <80 MM HG: CPT | Performed by: PHYSICIAN ASSISTANT

## 2024-10-31 PROCEDURE — 71101 X-RAY EXAM UNILAT RIBS/CHEST: CPT | Mod: LEFT SIDE | Performed by: RADIOLOGY

## 2024-10-31 PROCEDURE — 3075F SYST BP GE 130 - 139MM HG: CPT | Performed by: PHYSICIAN ASSISTANT

## 2024-10-31 PROCEDURE — 99214 OFFICE O/P EST MOD 30 MIN: CPT | Performed by: PHYSICIAN ASSISTANT

## 2024-10-31 PROCEDURE — 73502 X-RAY EXAM HIP UNI 2-3 VIEWS: CPT | Mod: RIGHT SIDE | Performed by: RADIOLOGY

## 2024-10-31 PROCEDURE — 1159F MED LIST DOCD IN RCRD: CPT | Performed by: PHYSICIAN ASSISTANT

## 2024-10-31 PROCEDURE — 1036F TOBACCO NON-USER: CPT | Performed by: PHYSICIAN ASSISTANT

## 2024-10-31 PROCEDURE — 71101 X-RAY EXAM UNILAT RIBS/CHEST: CPT | Mod: LT

## 2024-10-31 RX ORDER — METHOCARBAMOL 500 MG/1
500 TABLET, FILM COATED ORAL 3 TIMES DAILY
Qty: 30 TABLET | Refills: 0 | Status: SHIPPED | OUTPATIENT
Start: 2024-10-31

## 2024-11-08 ENCOUNTER — OFFICE VISIT (OUTPATIENT)
Dept: ORTHOPEDIC SURGERY | Facility: CLINIC | Age: 89
End: 2024-11-08
Payer: MEDICARE

## 2024-11-08 DIAGNOSIS — S33.6XXA SACROILIAC (LIGAMENT) SPRAIN, INITIAL ENCOUNTER: Primary | ICD-10-CM

## 2024-11-08 DIAGNOSIS — M62.830 MUSCLE SPASM OF BACK: ICD-10-CM

## 2024-11-08 PROCEDURE — 1125F AMNT PAIN NOTED PAIN PRSNT: CPT

## 2024-11-08 PROCEDURE — 99214 OFFICE O/P EST MOD 30 MIN: CPT

## 2024-11-08 PROCEDURE — 1159F MED LIST DOCD IN RCRD: CPT

## 2024-11-08 PROCEDURE — 1160F RVW MEDS BY RX/DR IN RCRD: CPT

## 2024-11-08 PROCEDURE — 1036F TOBACCO NON-USER: CPT

## 2024-11-08 RX ORDER — NALOXONE HYDROCHLORIDE 4 MG/.1ML
1 SPRAY NASAL AS NEEDED
Qty: 2 EACH | Refills: 0 | Status: SHIPPED | OUTPATIENT
Start: 2024-11-08

## 2024-11-08 RX ORDER — METHOCARBAMOL 500 MG/1
500 TABLET, FILM COATED ORAL 3 TIMES DAILY
Qty: 30 TABLET | Refills: 0 | Status: SHIPPED | OUTPATIENT
Start: 2024-11-08

## 2024-11-08 ASSESSMENT — PAIN - FUNCTIONAL ASSESSMENT: PAIN_FUNCTIONAL_ASSESSMENT: 0-10

## 2024-11-08 ASSESSMENT — PAIN SCALES - GENERAL: PAINLEVEL_OUTOF10: 4

## 2024-11-08 NOTE — PROGRESS NOTES
This is a consultation from Dr. Marlin Romo DO for   Chief Complaint   Patient presents with    Right Hip - Pain     Had fall on 10/31 buring pain   Had RT BHARAT 2016       This is a 89 y.o. male who presents for right hip pain following an injury about a week ago.  Patient states he fell experiencing pain in his posterior hip/lower back.  He states over the past few days it has been getting better.  He has been using a crutch to help with ambulation and taking weight off of his right hip.  He has been taking Robaxin and states that has been working well.  He is on a blood thinner and cannot take anti-inflammatories and Tylenol is not helping.  Patient describes his pain as sharp and sometimes radiates down his posterior right leg.  He was getting better and then he worked on his hands and knees for few hours and was sore the next day as well.  He is concerned about his right hip replacement and wants to make sure that all looks okay.    Physical Exam    There has been no interval change in this patient's past medical, surgical, medications, allergies, family history or social history since the most recent visit to a provider within our department. 14 point review of systems was performed, reviewed, and negative except for pertinent positives documented in the history of present illness.     Constitutional: well developed, well nourished male in no acute distress  Psychiatric: normal mood, appropriate affect  Eyes: sclera anicteric  HENT: normocephalic/atraumatic  CV: regular rate and rhythm   Respiratory: non labored breathing  Integumentary: no rash  Neurological: moves all extremities    Right hip exam: skin normal, no abrasions, wounds, or lacerations. nttp over greater trochanter. negative log roll, negative patric's test. flexion to 90 degrees without pain. no pain with flexion abduction and external rotation, no pain with flexion adduction and internal rotation.  He appears he has point tenderness over his  "right SI joint.  neurovascularly intact distally        Xrays were ordered by me, they were reviewed and independently interpreted by me today, they show stable right total hip arthroplasty with no signs of loosening or acute fractures.    Procedures      Impression/Plan: This is a 89 y.o. male with a recent fall that is caused inflammation in his SI joint and likely sciatic nerve as well.  I explained that he is doing the right thing with taking weight off and taking the medications he is able to.  I told him this will likely take another week or 2 for the inflammation and pain to decrease and explained that physical therapy might help with certain types of stretches they can help get rid of the pain and also prevent in the future.  I explained that if this does not help in a few weeks he medial to try to get an injection from pain management to see if this will provide relief.    BMI Readings from Last 1 Encounters:   10/31/24 32.14 kg/m²      Lab Results   Component Value Date    CREATININE 1.07 04/17/2023     Tobacco Use: Medium Risk (11/8/2024)    Patient History     Smoking Tobacco Use: Former     Smokeless Tobacco Use: Never     Passive Exposure: Past      Computed MELD 3.0 unavailable. One or more values for this score either were not found within the given timeframe or did not fit some other criterion.  Computed MELD-Na unavailable. One or more values for this score either were not found within the given timeframe or did not fit some other criterion.       Lab Results   Component Value Date    HGBA1C 7.6 (A) 10/03/2024     No results found for: \"STAPHMRSASCR\"  "

## 2024-11-25 DIAGNOSIS — M25.511 ACUTE PAIN OF RIGHT SHOULDER: ICD-10-CM

## 2024-11-25 RX ORDER — MELOXICAM 7.5 MG/1
7.5 TABLET ORAL DAILY
Qty: 90 TABLET | Refills: 3 | Status: SHIPPED | OUTPATIENT
Start: 2024-11-25

## 2024-12-03 ENCOUNTER — TELEPHONE (OUTPATIENT)
Dept: ORTHOPEDIC SURGERY | Facility: CLINIC | Age: 89
End: 2024-12-03
Payer: MEDICARE

## 2024-12-03 ENCOUNTER — TELEPHONE (OUTPATIENT)
Dept: ENDOCRINOLOGY | Facility: CLINIC | Age: 89
End: 2024-12-03
Payer: MEDICARE

## 2024-12-03 DIAGNOSIS — M62.830 MUSCLE SPASM OF BACK: ICD-10-CM

## 2024-12-04 RX ORDER — METHOCARBAMOL 500 MG/1
500 TABLET, FILM COATED ORAL 3 TIMES DAILY
Qty: 30 TABLET | Refills: 0 | Status: SHIPPED | OUTPATIENT
Start: 2024-12-04

## 2024-12-16 ENCOUNTER — APPOINTMENT (OUTPATIENT)
Dept: PRIMARY CARE | Facility: CLINIC | Age: 89
End: 2024-12-16
Payer: MEDICARE

## 2025-02-11 DIAGNOSIS — Z00.00 ENCOUNTER FOR GENERAL ADULT MEDICAL EXAMINATION WITHOUT ABNORMAL FINDINGS: ICD-10-CM

## 2025-02-11 RX ORDER — PANTOPRAZOLE SODIUM 40 MG/1
40 TABLET, DELAYED RELEASE ORAL DAILY
Qty: 90 TABLET | Refills: 3 | Status: SHIPPED | OUTPATIENT
Start: 2025-02-11

## 2025-03-11 ENCOUNTER — TELEMEDICINE (OUTPATIENT)
Dept: PRIMARY CARE | Facility: CLINIC | Age: 89
End: 2025-03-11
Payer: MEDICARE

## 2025-03-11 DIAGNOSIS — A08.11 NOROVIRUS: Primary | ICD-10-CM

## 2025-03-11 NOTE — PROGRESS NOTES
Subjective   Reason for Visit: William Mckinnon is an 89 y.o. male here for a Medicare annual Wellness      Virtual or Telephone Consent    While technically available, the patient was unable or unwilling to consent to connect via audio/video telehealth technology; therefore, I performed this visit using a real-time audio only connection between William Mckinnon & Marlin Romo DO.  Verbal consent was requested and obtained from William Mckinnon on this date, 03/11/25 for a telehealth visit and the patient's location was confirmed at the time of the visit.      HPI  Wears hearing aids  Recent pulmonary function tests. - to follow up with pulm for results.   Eye doctor yesterday with no Diabetic retinopathy, some concern of the glaucoma    #) diarrhea   - started on Sunday AM , and has BM every 30-60 mins for the last 2 days   - no other symptom   - no fever or vomiting   - some cramping and rectal irritation   - started some peptobismol   - recommend increase electrolytes     #) Reports change in urine odor for the last few months- better   - is darker -- much clearing   - UA show microscopic blood - no h/o kidney stones  - has had more pain in the right sacral area   - does seem better today   - was hit in the side with a baseball (early 20s) and was in the hospital since he was peeing blood   - CMP was good on 4/17/23, GFR 76 and Creatitnei 1.07     #) 8/5/23 was sting by a bunch of bees   - healing now   - started on benadryl right away   - no need for steroids or epi    #) right shoulder pain - very good, doing much better with PT with [facility]   - OA and xray in Feb  - pain down to 4-5/10   - but since then has had a fall on 7/7/23   - saw Dr. Mckoy [place],  was told the pain was muscular and they gave him a Cortizone shot, but it didn't help.   - will also try robaxin   - and an order for PT     #) T2DM - controlled   - on  Novolog 31 and 1.8 victoza every AM , 25 Units at lunch, then 32 with diner and then Lantus 40 units  at bedtime  - has a Junior - readings have been higher recently   - last A1c 6.2% --> 6.7% on 7/13/23--> 7.3% 2/23/24   - recent elevation in sugars after flu like symptoms after a grad party about one month ago   - follows with Dr Mohamud - rec increase lantus for 2 units , follow up scheduled on 10/3/24   - podiatry 7/11/24 -     #) Afib - stable   [place] in [place]   Started on 3/28/23 with Afib RVR with HR of 140   Admitted and did a cardiovertion   Is feeling a little better   Is on amiodarone and rivaroxaban   Stopped another one, irbesartan   Checking BP at home 120/60  HR is 55-77  Follow up with cardiologist , Dr Neil   Saw endocrinologist yesterday , Dr Mohamud  with A1c 7.1%   - started on ozempic over the victoza     Patient Care Team:  Marlin Romo DO as PCP - General (Family Medicine)  Marlin Romo DO as PCP - [facility]CO Attributed Provider  David Costa MD as PCP - [employer] PCP  Marlin Romo DO     Review of Systems  ROS was completed and all systems are negative with the exception of what was noted in the the HPI.     Objective   Vitals:  There were no vitals taken for this visit.      Physical Exam  Deferred due to phone call    Assessment/Plan   Problem List Items Addressed This Visit    None    Congrats on upcoming 66th wedding anniversary (5/3/24)   Happy belated 89th birthday!!     For the GI upset, make sure to stay hydrated, take the heartburn mediation as recommended  Avoid trigger food, such as acid or high fat foods.     Stay hydrated and monitor for blood in the stool    Try the but paste for the irritated rectum     Please call around and see about doing some vestibular rehab for the balance concerns.   Rehab was reordered last visit.     [facility]    [facility] to [facility] the microscopic blood in the urine and to review the enlargement of the prostate -- please call and schedule the appt.     We  reviewed the CT of the abdomen on 2/29/24-  1.  No definite renal stones or hydronephrosis.  2. Bilateral renal cyst with a smaller cyst in the left kidney being slightly complex and contain a small peripheral calcification.  3. Status post cholecystectomy.  4.  Bladder wall thickening. Clinical correlation and further evaluation may be obtained as clinically warranted.  5. Enlarged prostate gland. PSA was good on 4/1/24 at 2.09  6. Chronic lung changes in the lung bases, due to previous smoking history.     Vitamin D has been, increase to 4000IU Vitamin D3 per day       Monitor the left shoulder pain, try to do some of the exercises you learned for the right shoulder     Follow up with Dr Dominguez for the gel and steroid injections of the knee, glad that has helped     Magnesium was low in April 2023. Please increase the magnesium in the diet ( potatoes, tomatoes, bananas, etc. )     Continue with regular eye and dental examinations.     Call to make a follow appt.     Spent 9:10 mins on the telephone.

## 2025-04-04 ENCOUNTER — APPOINTMENT (OUTPATIENT)
Dept: ENDOCRINOLOGY | Facility: CLINIC | Age: 89
End: 2025-04-04
Payer: MEDICARE

## 2025-04-14 ENCOUNTER — APPOINTMENT (OUTPATIENT)
Dept: PRIMARY CARE | Facility: CLINIC | Age: 89
End: 2025-04-14
Payer: MEDICARE

## 2025-05-10 ENCOUNTER — APPOINTMENT (OUTPATIENT)
Dept: RADIOLOGY | Facility: HOSPITAL | Age: 89
End: 2025-05-10
Payer: MEDICARE

## 2025-05-10 ENCOUNTER — HOSPITAL ENCOUNTER (EMERGENCY)
Facility: HOSPITAL | Age: 89
Discharge: HOME | End: 2025-05-10
Attending: EMERGENCY MEDICINE
Payer: MEDICARE

## 2025-05-10 VITALS
TEMPERATURE: 97.7 F | HEIGHT: 70 IN | HEART RATE: 68 BPM | BODY MASS INDEX: 31.5 KG/M2 | WEIGHT: 220 LBS | SYSTOLIC BLOOD PRESSURE: 112 MMHG | OXYGEN SATURATION: 97 % | DIASTOLIC BLOOD PRESSURE: 64 MMHG | RESPIRATION RATE: 19 BRPM

## 2025-05-10 DIAGNOSIS — S09.90XA CLOSED HEAD INJURY, INITIAL ENCOUNTER: ICD-10-CM

## 2025-05-10 DIAGNOSIS — T07.XXXA MULTIPLE LACERATIONS: ICD-10-CM
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
## 2025-05-10 PROCEDURE — 90715 TDAP VACCINE 7 YRS/> IM: CPT | Mod: JZ | Performed by: EMERGENCY MEDICINE

## 2025-05-10 PROCEDURE — 2500000001 HC RX 250 WO HCPCS SELF ADMINISTERED DRUGS (ALT 637 FOR MEDICARE OP): Performed by: EMERGENCY MEDICINE

## 2025-05-10 PROCEDURE — 2500000004 HC RX 250 GENERAL PHARMACY W/ HCPCS (ALT 636 FOR OP/ED): Mod: JZ | Performed by: EMERGENCY MEDICINE

## 2025-05-10 PROCEDURE — 70450 CT HEAD/BRAIN W/O DYE: CPT

## 2025-05-10 PROCEDURE — 99284 EMERGENCY DEPT VISIT MOD MDM: CPT | Mod: 25 | Performed by: EMERGENCY MEDICINE

## 2025-05-10 PROCEDURE — 90471 IMMUNIZATION ADMIN: CPT | Performed by: EMERGENCY MEDICINE

## 2025-05-10 PROCEDURE — 70450 CT HEAD/BRAIN W/O DYE: CPT | Performed by: RADIOLOGY

## 2025-05-10 RX ORDER — DOXYCYCLINE HYCLATE 100 MG
100 TABLET ORAL ONCE
Status: COMPLETED | OUTPATIENT
Start: 2025-05-10 | End: 2025-05-10

## 2025-05-10 RX ORDER — DOXYCYCLINE 100 MG/1
100 TABLET ORAL 2 TIMES DAILY
Qty: 14 TABLET | Refills: 0 | Status: SHIPPED | OUTPATIENT
Start: 2025-05-10 | End: 2025-05-17

## 2025-05-10 RX ADMIN — DOXYCYCLINE HYCLATE 100 MG: 100 TABLET, COATED ORAL at 18:03

## 2025-05-10 RX ADMIN — TETANUS TOXOID, REDUCED DIPHTHERIA TOXOID AND ACELLULAR PERTUSSIS VACCINE, ADSORBED 0.5 ML: 5; 2.5; 8; 8; 2.5 SUSPENSION INTRAMUSCULAR at 17:12

## 2025-05-10 ASSESSMENT — PAIN SCALES - GENERAL
PAINLEVEL_OUTOF10: 3
PAINLEVEL_OUTOF10: 0 - NO PAIN

## 2025-05-10 ASSESSMENT — PAIN - FUNCTIONAL ASSESSMENT
PAIN_FUNCTIONAL_ASSESSMENT: 0-10
PAIN_FUNCTIONAL_ASSESSMENT: 0-10

## 2025-05-10 NOTE — DISCHARGE INSTRUCTIONS
Doxycycline as prescribed.    Take Tylenol for pain as needed.    Follow-up with your primary care doctor in 3 to 5 days for recheck.    Wear a bulky dressing for 24 hours.  Then wash wounds gently with soap and water and apply bacitracin.    Return for worsening symptoms or concerns.

## 2025-05-10 NOTE — ED PROVIDER NOTES
Emergency Department Provider Note       [facility]   ED  Provider Note  5/10/2025  4:19 PM  AC06/AC06      Chief Complaint   Patient presents with    Fall     Skin tears right hand and forearm        History of Present Illness:   William Mckinnon is a 89 y.o. male presenting to the ED for fall, beginning shortly before arrival.  The complaint has been persistent, moderate in severity, and worsened by nothing.  Patient is an 89-year-old male who was doing yard work with his wife and daughter.  He noticed his wife had fallen down a hill trying to dump the wheelbarrow and went to help her when he also fell down the hill.  He is taking Eliquis he denies any head injury or loss of conscious.  He does have multiple abrasions and lacerations from landing on a thorn bush.  He denies focal deficit.  He denies change in vision.  Denies loss of strength or sensation.      Review of Systems:   Pertinent positives and review of systems as noted above.  Remaining 10 review of systems is negative or noncontributory to today's episode of care.  Review of Systems       --------------------------------------------- PAST HISTORY ---------------------------------------------  Past Medical History:   Past Medical History:   Diagnosis Date    Atrial fibrillation, unspecified type (Multi)     Diabetes (Multi)     type 2    Diabetic polyneuropathy associated with type 2 diabetes mellitus     Dyslipidemia     Elevated cholesterol     Essential (primary) hypertension     Hypertension     Macular degeneration     Mixed hyperlipidemia     Neuropathy     Primary hypertension     Retinopathy     Sleep apnea     Type 2 diabetes mellitus with diabetic polyneuropathy, with long-term current use of insulin         Past Surgical History:   Past Surgical History:   Procedure Laterality Date    BACK SURGERY      HERNIA REPAIR  07/17/2013    Hernia Repair Inguinal Bilateral    KNEE SURGERY Left     OTHER SURGICAL HISTORY  07/17/2013    Cath Stent  Placement Number Of Stents Placed:    OTHER SURGICAL HISTORY  07/17/2013    Lissteh Holes Without Other Surgery    ROTATOR CUFF REPAIR      TOTAL HIP ARTHROPLASTY Right 08/28/2014    Total Hip Replacement        Social History:   Social History     Social History Narrative    Not on file        Family History: family history includes Cancer in his sister; Diabetes in his sister; Heart disease in his mother; Stroke in his father and mother; Systemic Lupus Erythematosus in his sister; cardiac arrest in his brother; stroke syndrome in his brother. Unless otherwise noted, family history is non contributory    Patient's Medications   New Prescriptions    No medications on file   Previous Medications    AMIODARONE (PACERONE) 200 MG TABLET    Take 1 tablet (200 mg) by mouth once daily.    AMLODIPINE (NORVASC) 10 MG TABLET    Take by mouth once daily.    ASPIRIN 81 MG EC TABLET    1 tablet (81 mg) 1 time.    ATENOLOL (TENORMIN) 25 MG TABLET    Take by mouth once daily.    ATORVASTATIN (LIPITOR) 40 MG TABLET    Take 1 tablet (40 mg) by mouth once daily.    BLOOD SUGAR DIAGNOSTIC STRIP    Per endo    BRIMONIDINE-TIMOLOL (COMBIGAN) 0.2-0.5 % OPHTHALMIC SOLUTION        CLOPIDOGREL (PLAVIX) 75 MG TABLET        CYANOCOBALAMIN (VITAMIN B-12) 1,000 MCG/ML INJECTION    Inject 1 mL (1,000 mcg) into the muscle every 30 (thirty) days.    FISH OIL CONCENTRATE (OMEGA-3) 120-180 MG CAPSULE    Take 1 capsule (1 g) by mouth.    FLUOCINONIDE 0.05 % CREAM    Apply 1 Application topically once daily as needed for rash (itch).    FLUOXETINE (PROZAC) 20 MG CAPSULE    Take 1 capsule (20 mg) by mouth once daily.    FLUTICASONE (FLONASE) 50 MCG/ACTUATION NASAL SPRAY    Administer 2 sprays into each nostril once daily in the morning.    FREESTYLE HANNA 2 SENSOR KIT    USE 1 SENSOR AS DIRECTED EVERY 14 DAYS, E11.65 ON INSULIN    FUROSEMIDE (LASIX) 40 MG TABLET    Take 1 tablet (40 mg) by mouth once every day.    GABAPENTIN (NEURONTIN) 600 MG TABLET     Take 1 tablet (600 mg) by mouth 3 times a day.    GARLIC 1,000 MG CAPSULE    Garlic 1000 MG Oral Capsule   Refills: 0       Active    INSULIN ASPART (NOVOLOG FLEXPEN U-100 INSULIN) 100 UNIT/ML (3 ML) PEN    Inject 30 Units under the skin 3 times a day before meals.    INSULIN GLARGINE (BASAGLAR KWIKPEN U-100 INSULIN) 100 UNIT/ML (3 ML) PEN    40 Units.    IRBESARTAN (AVAPRO) 300 MG TABLET    Take 1 tablet (300 mg) by mouth once every day.    ISOSORBIDE MONONITRATE ER (IMDUR) 60 MG 24 HR TABLET    Take 1 tablet (60 mg) by mouth once daily in the morning.    LATANOPROST (XALATAN) 0.005 % OPHTHALMIC SOLUTION    Latanoprost 0.005 % Ophthalmic Solution   Quantity: 8  Refills: 0        Start : 23-May-2022   Active    LIRAGLUTIDE (VICTOZA) 0.6 MG/0.1 ML (18 MG/3 ML) INJECTION    Inject 0.3 mL (1.8 mg) under the skin once daily.    LORATADINE (CLARITIN LIQUI-GEL) 10 MG CAPSULE    Claritin 10 MG Oral Capsule   Refills: 0       Active    LOSARTAN (COZAAR) 50 MG TABLET        MAGNESIUM OXIDE (MAG-OX) 400 MG (241.3 MG MAGNESIUM) TABLET    Take by mouth.    MAGNESIUM OXIDE 240 MG MAGNESIUM POWDER IN PACKET    Take by mouth.    MELOXICAM (MOBIC) 7.5 MG TABLET    TAKE 1 TABLET BY MOUTH ONCE DAILY.    METHOCARBAMOL (ROBAXIN) 500 MG TABLET    Take 1 tablet (500 mg) by mouth 3 times a day as needed for muscle spasms.    METHOCARBAMOL (ROBAXIN) 500 MG TABLET    Take 1 tablet (500 mg) by mouth 3 times a day.    METOPROLOL SUCCINATE XL (TOPROL-XL) 50 MG 24 HR TABLET    Take 1 tablet (50 mg) by mouth once every day.    MOMETASONE (NASONEX) 50 MCG/ACTUATION NASAL SPRAY    Administer 1 spray into affected nostril(s).    MULTIVITAMIN TABLET    Multivitamins TABS   Refills: 0       Active    NALOXONE (NARCAN) 4 MG/0.1 ML NASAL SPRAY    Administer 1 spray (4 mg) into affected nostril(s) if needed for opioid reversal. May repeat every 2-3 minutes if needed, alternating nostrils, until medical assistance becomes available.    NITROGLYCERIN  (NITROSTAT) 0.4 MG SL TABLET    Place 1 tablet under the tongue every 5 minutes for up to 3 doses as needed for chest pain. Call 911 if pain persists    NON FORMULARY    VELTRIX    OMEGA-3 1,000 MG CAPSULE CAPSULE    Take 5 capsules (5,000 mg) by mouth once daily.    OMEPRAZOLE (PRILOSEC) 20 MG DR CAPSULE    Take 1 capsule (20 mg) by mouth once daily.    OXYGEN (O2) THERAPY    Inhale 2 L/min at 120,000 mL/hr. AT NIGHT.  OXYGEN LIQUID    PANTOPRAZOLE (PROTONIX) 40 MG EC TABLET    TAKE 1 TABLET BY MOUTH EVERY DAY    RIVAROXABAN (XARELTO) 20 MG TABLET    1 tablet (20 mg) once daily at bedtime.    SAW PALMETTO 500 MG CAPSULE    Saw Palmetto 500 MG Oral Capsule   Refills: 0       Active    SEMAGLUTIDE (OZEMPIC) 0.25 MG OR 0.5 MG (2 MG/3 ML) PEN INJECTOR    Inject 0.25 mg under the skin 1 (one) time per week. 0.25 mg weekly x 4 weeks then increase to 0.50 mg weekly    SPIRONOLACTON-HYDROCHLOROTHIAZ (ALDACTAZIDE) 25-25 MG TABLET    Take 1 tablet (25 mg) by mouth once daily.    TERBINAFINE (LAMISIL) 250 MG TABLET    Take 1 tablet (250 mg) by mouth once daily.    TRAMADOL (ULTRAM) 50 MG TABLET    Take 1 tablet (50 mg) by mouth every 6 hours.    TRIAMCINOLONE (KENALOG) 0.1 % CREAM    APPLY TO AFFECTED AREA ONCE DAILY AS NEEDED FOR RASH/ITCHING *30DS* *GIVE MD OFFICE A CALL*   Modified Medications    No medications on file   Discontinued Medications    No medications on file      The patient’s home medications have been reviewed.    Allergies: Iodinated contrast media, Cyclobenzaprine, Epinephrine, Metformin, Midazolam, Nizatidine, Oxycodone-acetaminophen, Shellfish derived, Terazosin, Adhesive, Amoxicillin-pot clavulanate, and Indomethacin    -------------------------------------------------- RESULTS -------------------------------------------------  All laboratory and radiology results have been personally reviewed by myself   LABS:  Labs Reviewed - No data to display      RADIOLOGY:  Interpreted by Radiologist.  CT head wo  "IV contrast   Final Result   No acute intracranial hemorrhage or mass-effect.        MACRO:   None.        Signed by: Sadaf Carter 5/10/2025 5:08 PM   Dictation workstation:   [id number]          No results found for this or any previous visit (from the past 4464 hours).  ------------------------- NURSING NOTES AND VITALS REVIEWED ---------------------------   The nursing notes within the ED encounter and vital signs as below have been reviewed.   /80   Pulse 67   Temp 36.4 °C (97.5 °F) (Temporal)   Resp 19   Ht 1.778 m (5' 10\")   Wt 99.8 kg (220 lb)   SpO2 96%   BMI 31.57 kg/m²   Oxygen Saturation Interpretation: Normal      ---------------------------------------------------PHYSICAL EXAM--------------------------------------  Physical Exam   Constitutional/General: Alert,  well appearing, non toxic in NAD  Head: Normocephalic and atraumatic  Eyes: PERRL, EOMI, conjunctiva normal, sclera non icteric  Mouth: Oropharynx clear, handling secretions, no trismus, no asymmetry of the posterior oropharynx or uvular edema  Neck: Supple, full ROM, non tender to palpation in the midline, no stridor, no crepitus, no meningeal signs  Respiratory: Lungs clear to auscultation bilaterally, no wheezes, rales, or rhonchi. Not in respiratory distress  Cardiovascular:  Regular rate. Regular rhythm. No murmurs, gallops, or rubs. 2+ distal pulses  Chest: No chest wall tenderness  GI:  Abdomen Soft, Non tender, Non distended.  +BS. No organomegaly, no palpable masses,  No rebound, guarding, or rigidity.   Musculoskeletal: Moves all extremities x 4. Warm and well perfused, no clubbing, cyanosis, or edema. Capillary refill <3 seconds, gait is normal ribs are nontender, spine and back are nontender to palpation.  Integument: skin warm and dry.  Small skin tears to the right arm forearm and hand.  Small puncture wounds noted as well.  No bony tenderness  Lymphatic: no lymphadenopathy noted  Neurologic: No focal deficits, " symmetric strength 5/5 in the upper and lower extremities bilaterally  Psychiatric: Normal Affect    Procedure note:  Patient has multiple skin tears and puncture wounds from falling down hill and landing in a thorn bush.  His wounds were cleaned carefully and inspected carefully.  3 small thorns and several small pieces of dirt removed from the wounds.  They are washed once again and dressed before discharge.  There are no wounds requiring suturing.    ------------------------------ ED COURSE/MEDICAL DECISION MAKING----------------------  Diagnoses as of 05/10/25 1742   Fall, initial encounter   Closed head injury, initial encounter   Multiple lacerations      Patient is an 89-year-old male with saws wife at the bottom of the hill where they were doing some yard work.  She been trying to dump the wheelbarrow and rolled down the hill.  He went to helper and fell down the hill himself landing in a thorn bush.  He has multiple small skin tears over his right arm and hand.  3 small thorns and several pieces of dirt removed carefully from the wounds as they were cleaned individually.  The patient is on Eliquis for his atrial fibrillation.  He has no head pain or neck pain but a CT scan is obtained as a precaution which was negative for acute bleed or injury.  Patient's tetanus shot was updated with a Boostrix injection.  The patient's wounds were cleaned and dressed and he is stable for outpatient management.    Differential Diagnosis: Fall, closed head injury, intracranial hemorrhage, multiple lacerations with small retained foreign bodies.    Medical Decision Making:   Discharge to home  Diagnoses as of 05/10/25 1742   Fall, initial encounter   Closed head injury, initial encounter   Multiple lacerations        Counseling:   The emergency provider has spoken with the patient and discussed today’s results, in addition to providing specific details for the plan of care and counseling regarding the diagnosis and  prognosis.  Questions are answered at this time and they are agreeable with the plan.      --------------------------------- IMPRESSION AND DISPOSITION ---------------------------------        IMPRESSION  1. Fall, initial encounter    2. Closed head injury, initial encounter    3. Multiple lacerations        DISPOSITION  Disposition: Discharge to home  Patient condition is fair      Billing Provider Critical Care Time: 0 minutes     Sameer Castillo MD  05/10/25 [id number]

## 2025-05-12 ENCOUNTER — CLINICAL SUPPORT (OUTPATIENT)
Facility: CLINIC | Age: 89
End: 2025-05-12
Payer: MEDICARE

## 2025-05-12 VITALS
WEIGHT: 228.4 LBS | HEART RATE: 52 BPM | SYSTOLIC BLOOD PRESSURE: 132 MMHG | BODY MASS INDEX: 32.77 KG/M2 | DIASTOLIC BLOOD PRESSURE: 60 MMHG

## 2025-05-12 DIAGNOSIS — E78.2 MIXED HYPERLIPIDEMIA: ICD-10-CM

## 2025-05-12 DIAGNOSIS — E11.3213 TYPE 2 DIABETES MELLITUS WITH BOTH EYES AFFECTED BY MILD NONPROLIFERATIVE RETINOPATHY AND MACULAR EDEMA, WITHOUT LONG-TERM CURRENT USE OF INSULIN: Primary | ICD-10-CM

## 2025-05-12 DIAGNOSIS — I10 PRIMARY HYPERTENSION: ICD-10-CM

## 2025-05-12 LAB — POC HEMOGLOBIN A1C: 7.2 % (ref 4.2–6.5)

## 2025-05-12 PROCEDURE — 95251 CONT GLUC MNTR ANALYSIS I&R: CPT | Performed by: INTERNAL MEDICINE

## 2025-05-12 PROCEDURE — 99214 OFFICE O/P EST MOD 30 MIN: CPT | Performed by: PHARMACIST

## 2025-05-12 PROCEDURE — 99214 OFFICE O/P EST MOD 30 MIN: CPT | Performed by: INTERNAL MEDICINE

## 2025-05-12 PROCEDURE — 83036 HEMOGLOBIN GLYCOSYLATED A1C: CPT | Performed by: PHARMACIST

## 2025-05-12 RX ORDER — SEMAGLUTIDE 1.34 MG/ML
1 INJECTION, SOLUTION SUBCUTANEOUS
Qty: 9 ML | Refills: 1 | Status: SHIPPED | OUTPATIENT
Start: 2025-05-12

## 2025-05-12 NOTE — PROGRESS NOTES
HPI     88 yo with Diabetes 2 (neuropathy/retinopathy), HTN, Dyslipidemia, karli presents for followup. Last A1c- 7.6%,  today 7.2%.     Pt is testing sugars >4 times per day using surjit 2 w/ reader. Pt is having low sugars 0 times/week. Pt's typical blood sugars are running under 140 AM under 200 before bed. Pt is following a carb controlled diet and knows reasonable carb allowances. Pt is able to afford their medications. Pt is exercising.    Taking Lantus 40 units, Novolog 30 units, isf-30 over 150, ozempic 0.5mg weekly -started last visit in place of Victo[facility], [facility]     -STATIN Atorvastatin 40 mg tolerating     -on several bp/cv meds, see below  -taking amiodarone since spring 2023 (TSH 6 months)    30 day surjit 2 data: 64% in range, 0% low, pattern: mid to low 100's overnight, mid/upper 100's through the day.        Current Outpatient Medications:     amiodarone (Pacerone) 200 mg tablet, Take 1 tablet (200 mg) by mouth once daily., Disp: , Rfl:     amLODIPine (Norvasc) 10 mg tablet, Take by mouth once daily., Disp: , Rfl:     aspirin 81 mg EC tablet, 1 tablet (81 mg) 1 time., Disp: , Rfl:     atenolol (Tenormin) 25 mg tablet, Take by mouth once daily., Disp: , Rfl:     atorvastatin (Lipitor) 40 mg tablet, Take 1 tablet (40 mg) by mouth once daily., Disp: , Rfl:     blood sugar diagnostic strip, Per endo, Disp: , Rfl:     brimonidine-timoloL (Combigan) 0.2-0.5 % ophthalmic solution, , Disp: , Rfl:     clopidogrel (Plavix) 75 mg tablet, , Disp: , Rfl:     cyanocobalamin (Vitamin B-12) 1,000 mcg/mL injection, Inject 1 mL (1,000 mcg) into the muscle every 30 (thirty) days., Disp: , Rfl:     doxycycline (Adoxa) 100 mg tablet, Take 1 tablet (100 mg) by mouth 2 times a day for 7 days. Take with a full glass of water and do not lie down for at least 30 minutes after, Disp: 14 tablet, Rfl: 0    fish oil concentrate (Omega-3) 120-180 mg capsule, Take 1 capsule (1 g) by mouth., Disp: , Rfl:     fluocinonide 0.05 %  cream, Apply 1 Application topically once daily as needed for rash (itch)., Disp: , Rfl:     FLUoxetine (PROzac) 20 mg capsule, Take 1 capsule (20 mg) by mouth once daily., Disp: , Rfl:     fluticasone (Flonase) 50 mcg/actuation nasal spray, Administer 2 sprays into each nostril once daily in the morning., Disp: , Rfl:     FreeStyle Junior 2 Sensor kit, USE 1 SENSOR AS DIRECTED EVERY 14 DAYS, E11.65 ON INSULIN, Disp: , Rfl:     furosemide (Lasix) 40 mg tablet, Take 1 tablet (40 mg) by mouth once every day., Disp: , Rfl:     gabapentin (Neurontin) 600 mg tablet, Take 1 tablet (600 mg) by mouth 3 times a day., Disp: , Rfl:     garlic 1,000 mg capsule, Garlic 1000 MG Oral Capsule  Refills: 0     Active, Disp: , Rfl:     insulin aspart (NovoLOG FlexPen U-100 Insulin) 100 unit/mL (3 mL) pen, Inject 30 Units under the skin 3 times a day before meals., Disp: , Rfl:     insulin glargine (Basaglar KwikPen U-100 Insulin) 100 unit/mL (3 mL) pen, 40 Units., Disp: , Rfl:     irbesartan (Avapro) 300 mg tablet, Take 1 tablet (300 mg) by mouth once every day., Disp: , Rfl:     isosorbide mononitrate ER (Imdur) 60 mg 24 hr tablet, Take 1 tablet (60 mg) by mouth once daily in the morning., Disp: , Rfl:     latanoprost (Xalatan) 0.005 % ophthalmic solution, Latanoprost 0.005 % Ophthalmic Solution  Quantity: 8  Refills: 0      Start : 23-May-2022  Active, Disp: , Rfl:     loratadine (Claritin Liqui-Gel) 10 mg capsule, Claritin 10 MG Oral Capsule  Refills: 0     Active, Disp: , Rfl:     losartan (Cozaar) 50 mg tablet, , Disp: , Rfl:     magnesium oxide (Mag-Ox) 400 mg (241.3 mg magnesium) tablet, Take by mouth., Disp: , Rfl:     magnesium oxide 240 mg magnesium powder in packet, Take by mouth., Disp: , Rfl:     meloxicam (Mobic) 7.5 mg tablet, TAKE 1 TABLET BY MOUTH ONCE DAILY., Disp: 90 tablet, Rfl: 3    methocarbamol (Robaxin) 500 mg tablet, Take 1 tablet (500 mg) by mouth 3 times a day as needed for muscle spasms., Disp: 40 tablet,  Rfl: 1    methocarbamol (Robaxin) 500 mg tablet, Take 1 tablet (500 mg) by mouth 3 times a day., Disp: 30 tablet, Rfl: 0    metoprolol succinate XL (Toprol-XL) 50 mg 24 hr tablet, Take 1 tablet (50 mg) by mouth once every day., Disp: , Rfl:     mometasone (Nasonex) 50 mcg/actuation nasal spray, Administer 1 spray into affected nostril(s)., Disp: , Rfl:     multivitamin tablet, Multivitamins TABS  Refills: 0     Active, Disp: , Rfl:     naloxone (Narcan) 4 mg/0.1 mL nasal spray, Administer 1 spray (4 mg) into affected nostril(s) if needed for opioid reversal. May repeat every 2-3 minutes if needed, alternating nostrils, until medical assistance becomes available., Disp: 2 each, Rfl: 0    nitroglycerin (Nitrostat) 0.4 mg SL tablet, Place 1 tablet under the tongue every 5 minutes for up to 3 doses as needed for chest pain. Call 911 if pain persists, Disp: , Rfl:     NON FORMULARY, VELTRIX, Disp: , Rfl:     omega-3 1,000 mg capsule capsule, Take 5 capsules (5,000 mg) by mouth once daily., Disp: , Rfl:     omeprazole (PriLOSEC) 20 mg DR capsule, Take 1 capsule (20 mg) by mouth once daily., Disp: , Rfl:     oxygen (O2) therapy, Inhale 2 L/min at 120,000 mL/hr. AT NIGHT.  OXYGEN LIQUID, Disp: , Rfl:     pantoprazole (ProtoNix) 40 mg EC tablet, TAKE 1 TABLET BY MOUTH EVERY DAY, Disp: 90 tablet, Rfl: 3    rivaroxaban (Xarelto) 20 mg tablet, 1 tablet (20 mg) once daily at bedtime., Disp: , Rfl:     saw palmetto 500 mg capsule, Saw Palmetto 500 MG Oral Capsule  Refills: 0     Active, Disp: , Rfl:     semaglutide (Ozempic) 1 mg/dose (4 mg/3 mL) pen injector, Inject 1 mg under the skin 1 (one) time per week., Disp: 9 mL, Rfl: 1    spironolacton-hydrochlorothiaz (Aldactazide) 25-25 mg tablet, Take 1 tablet (25 mg) by mouth once daily., Disp: , Rfl:     terbinafine (LamISIL) 250 mg tablet, Take 1 tablet (250 mg) by mouth once daily., Disp: , Rfl:     traMADol (Ultram) 50 mg tablet, Take 1 tablet (50 mg) by mouth every 6 hours.,  "Disp: , Rfl:     triamcinolone (Kenalog) 0.1 % cream, APPLY TO AFFECTED AREA ONCE DAILY AS NEEDED FOR RASH/ITCHING *30DS* *GIVE MD OFFICE A CALL*, Disp: , Rfl:     Allergies as of 05/12/2025 - Reviewed 05/12/2025   Allergen Reaction Noted    Iodinated contrast media Anaphylaxis, Unknown, and Other 11/06/2006    Cyclobenzaprine Unknown 04/06/2023    Epinephrine Unknown 04/06/2023    Metformin Diarrhea 03/03/2020    Midazolam Unknown 04/06/2023    Nizatidine Unknown 08/19/2012    Oxycodone-acetaminophen Unknown 04/06/2023    Shellfish derived Unknown 08/02/2019    Terazosin Unknown 08/19/2012    Adhesive Rash 06/21/2013    Amoxicillin-pot clavulanate Unknown, Rash, Hives, and Other 11/30/2018    Indomethacin Other, Unknown, and Rash 11/21/2005       /60   Pulse 52   Wt 104 kg (228 lb 6.4 oz)   BMI 32.77 kg/m²     Labs:   Lab Results   Component Value Date    WBC 6.1 08/22/2024    NRBC 0.0 08/22/2024    RBC 3.65 (L) 08/22/2024    HGB 11.1 (L) 08/22/2024    HCT 33.8 (L) 08/22/2024     08/22/2024     Lab Results   Component Value Date    CALCIUM 9.4 04/17/2023    AST 31 04/17/2023    ALKPHOS 37 04/17/2023    BILITOT 0.7 04/17/2023    PROT 7.7 04/17/2023    ALBUMIN 4.5 04/17/2023     04/17/2023    K 4.0 04/17/2023     04/17/2023    CO2 28 04/17/2023    ANIONGAP 13 04/17/2023    BUN 21 04/17/2023    CREATININE 1.07 04/17/2023    GLUCOSE 128 (H) 04/17/2023    ALT 21 04/17/2023     Lab Results   Component Value Date    CHOL 112 08/19/2022    TRIG 261 (H) 08/19/2022    HDL 30.6 (A) 08/19/2022     No results found for: \"MICROALBCREA\"  Lab Results   Component Value Date    TSH 1.70 04/17/2023     Lab Results   Component Value Date    [id number] 610 09/21/2021     Lab Results   Component Value Date    HGBA1C 7.2 (A) 05/12/2025         Assessment/Plan   1. Type 2 diabetes mellitus with both eyes affected by mild nonproliferative retinopathy and macular edema, without long-term current use of insulin " (Primary)    -A1c ordered & reviewed  -[facility], [facility] will [facility] copy results to office  -surjit data reviewed, scanned into epic     -doing well on ozempic, incr to 1mg dose weekly   -given paper copy rx   -decr basal insulin 30 units daily  -decr bolus insulin base 20 units, titrate 5 unit intervals q3d as needed for lows     2. Mixed hyperlipidemia  -on statin, tolerating, pt will fax recent labs     3. Primary hypertension  -at target on therapy after resting, no changes        Follow up: 6mon bb    -labs/tests/notes reviewed  -reviewed and counseled patient on medication monitoring and side effects    Treatment and plan discussed with Dr. Bowman.  ABIGAIL Smith, PharmD, BC-ADM, [facility].     Medical Decision Making  Complexity of problem: Chronic illness of diabetes mellitus uncontrolled, progressing  Data analyzed and reviewed: Reviewed prior notes, blood glucose data, labs including HgbA1c, lipids, serum chemistries.  Ordered tests.   Risk of complications and morbidities: Is definite because of use of insulin and risk of hypoglycemia.  Prescription medications reviewed and modifications made.  Compliance assessed.  Addressed social determinants of health including food insecurity.

## 2025-05-12 NOTE — PROGRESS NOTES
Attestation signed by Bradford Bowman MD on 5/12/25 at 1:26 PM.    I, Dr Bradford Bowman, have reviewed this progress note, medication list, vital signs, any pertinent lab values, and any CGM data if present with the [facility] and  face to face during this visit today. This note reflects the treatment plan that was made under my direction after reviewing the above mentioned elements while face to face with the patient and CDE.  I personally answered and addressed any questions and concerns the patient had during the visit today.  The CDE entered the data in this note under my direction and I personally reviewed it, signed any lab or medication orders that I instructed to be completed. I am the billing provider for this visit and the level of service was determined by my involvement in the Medical Decision Making Component of this visit while face to face with the patient.

## 2025-05-12 NOTE — PATIENT INSTRUCTIONS
Finish up Ozempic 0.5mg,  then increase to 1mg weekly thereafter     Decrease Lantus to 30 units daily     Decrease Novolog base to 20 units,  plus sliding scale as needed   -decrease base further 5 unit intervals every 3 days as needed for low blood sugars     Follow up 6 months with Dr. Bowman

## 2025-05-20 ENCOUNTER — APPOINTMENT (OUTPATIENT)
Dept: PRIMARY CARE | Facility: CLINIC | Age: 89
End: 2025-05-20
Payer: MEDICARE

## 2025-05-20 VITALS
WEIGHT: 221 LBS | SYSTOLIC BLOOD PRESSURE: 126 MMHG | OXYGEN SATURATION: 96 % | DIASTOLIC BLOOD PRESSURE: 78 MMHG | BODY MASS INDEX: 31.71 KG/M2 | HEART RATE: 58 BPM
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
## 2025-05-20 DIAGNOSIS — R23.3 ABNORMAL BRUISING: ICD-10-CM

## 2025-05-20 ASSESSMENT — PATIENT HEALTH QUESTIONNAIRE - PHQ9
1. LITTLE INTEREST OR PLEASURE IN DOING THINGS: NOT AT ALL
2. FEELING DOWN, DEPRESSED OR HOPELESS: NOT AT ALL
SUM OF ALL RESPONSES TO PHQ9 QUESTIONS 1 AND 2: 0

## 2025-05-20 NOTE — PROGRESS NOTES
Subjective   Reason for Visit: William Mckinnon is an 89 y.o. male here for ER follow up     Reviewed all medications by prescribing practitioner or clinical pharmacist (such as prescriptions, OTCs, herbal therapies and supplements) and documented in the medical record.    ER Follow-up      Wears hearing aids  Recent pulmonary function tests. - to follow up with pulm for results.   Eye doctor yesterday with no Diabetic retinopathy, some concern of the glaucoma    #) fall   - was going to assist wife who fell down the band trying to dump a wheelbarrow, and he fell too  - right arm Junior sensor ripped off   Tdap was updated   - imaging was neg for fx   - some superficial arm wounds, closes, none draining   - no sligth neck pain, improving     #) diarrhea   - started on Sunday AM , and has BM every 30-60 mins for the last 2 days   - no other symptom   - no fever or vomiting   - some cramping and rectal irritation   - started some peptobismol   - recommend increase electrolytes     #) Reports change in urine odor for the last few months- better   - is darker -- much clearing   - UA show microscopic blood - no h/o kidney stones  - has had more pain in the right sacral area   - does seem better today   - was hit in the side with a baseball (early 20s) and was in the hospital since he was peeing blood   - CMP was good on 4/17/23, GFR 76 and Creatitnei 1.07     #) 8/5/23 was sting by a bunch of bees   - healing now   - started on benadryl right away   - no need for steroids or epi    #) right shoulder pain - very good, doing much better with PT with [facility]   - OA and xray in Feb  - pain down to 4-5/10   - but since then has had a fall on 7/7/23   - saw Dr. Mckoy [place],  was told the pain was muscular and they gave him a Cortizone shot, but it didn't help.   - will also try robaxin   - and an order for PT     #) T2DM - controlled   - on  Novolog 31 and 1.8 victoza every AM , 25 Units at lunch, then 32 with diner and then Lantus 40  units at bedtime  - has a Junior - readings have been higher recently   - last A1c 6.2% --> 6.7% on 7/13/23--> 7.3% 2/23/24   - recent elevation in sugars after flu like symptoms after a grad party about one month ago   - follows with Dr Mhoamud - rec increase lantus for 2 units , follow up scheduled on 10/3/24   - podiatry 7/11/24 -     #) Afib - stable   [place] in [place]   Started on 3/28/23 with Afib RVR with HR of 140   Admitted and did a cardiovertion   Is feeling a little better   Is on amiodarone and rivaroxaban   Stopped another one, irbesartan   Checking BP at home 120/60  HR is 55-77  Follow up with cardiologist , Dr Neil   Saw endocrinologist yesterday , Dr Mohamud  with A1c 7.1%   - started on ozempic over the victoza     Patient Care Team:  Marlin Romo DO as PCP - General (Family Medicine)  Marlin Romo DO as PCP - [facility] Attributed Provider  Marlin Romo DO     Review of Systems  ROS was completed and all systems are negative with the exception of what was noted in the the HPI.     Objective   Vitals:  /78   Pulse 58   Wt 100 kg (221 lb)   SpO2 96%   BMI 31.71 kg/m²       Physical Exam  GEN: A+O, no acute distress  HEENT: NC/AT, Oropharynx clear, no exudates, TM visualized, Extraoccular muscles intact, no facial droop; no thyromegaly or cervical LAD  RESP: CTAB, no wheezes   CV: RRR, no murmurs  ABD: soft, non-tender, + BS  SKIN: no rashes or bruising, no peripheral edema   NEURO: CN II-XII grossly intact, moves all extremities equally, no tremor   PSYCH: normal affect, appropriate mood     Assessment/Plan   Problem List Items Addressed This Visit    None  Glad you are doing ok   The arm wounds look like they are healing well   The abdominal rash looks like bug bites, use the triamcinolone ointment for the itch     Follow up as recommend for routine follow up

## 2025-05-20 NOTE — PATIENT INSTRUCTIONS
Glad you are doing ok   The arm wounds look like they are healing well   The abdominal rash looks like bug bites, use the triamcinolone ointment for the itch     Follow up as recommend for routine follow up

## 2025-06-06 ENCOUNTER — OFFICE VISIT (OUTPATIENT)
Dept: URGENT CARE | Facility: URGENT CARE | Age: 89
End: 2025-06-06
Payer: MEDICARE

## 2025-06-06 VITALS
RESPIRATION RATE: 19 BRPM | SYSTOLIC BLOOD PRESSURE: 164 MMHG | WEIGHT: 221 LBS | HEART RATE: 67 BPM | BODY MASS INDEX: 31.71 KG/M2 | TEMPERATURE: 97.7 F | OXYGEN SATURATION: 96 % | DIASTOLIC BLOOD PRESSURE: 78 MMHG

## 2025-06-06 DIAGNOSIS — T14.8XXA WOUND INFECTION: Primary | ICD-10-CM

## 2025-06-06 DIAGNOSIS — L08.9 WOUND INFECTION: Primary | ICD-10-CM

## 2025-06-06 RX ORDER — DOXYCYCLINE 100 MG/1
100 CAPSULE ORAL 2 TIMES DAILY
Qty: 14 CAPSULE | Refills: 0 | Status: SHIPPED | OUTPATIENT
Start: 2025-06-06 | End: 2025-06-13

## 2025-06-06 ASSESSMENT — ENCOUNTER SYMPTOMS: WOUND: 1

## 2025-06-06 NOTE — PROGRESS NOTES
Subjective   Patient ID: William Mckinnon is a 89 y.o. male. They present today with a chief complaint of left shin wound (Pt states left shin wound x 1 wk. Not healing very well).    History of Present Illness  See [facility]      History provided by:  Patient      Past Medical History  Allergies as of 06/06/2025 - Reviewed 06/06/2025   Allergen Reaction Noted    Iodinated contrast media Anaphylaxis, Unknown, and Other 11/06/2006    Cyclobenzaprine Unknown 04/06/2023    Epinephrine Unknown 04/06/2023    Metformin Diarrhea 03/03/2020    Midazolam Unknown 04/06/2023    Nizatidine Unknown 08/19/2012    Oxycodone-acetaminophen Unknown 04/06/2023    Shellfish derived Unknown 08/02/2019    Terazosin Unknown 08/19/2012    Adhesive Rash 06/21/2013    Amoxicillin-pot clavulanate Unknown, Rash, Hives, and Other 11/30/2018    Indomethacin Other, Unknown, and Rash 11/21/2005       Prescriptions Prior to Admission[1]     Medical History[2]    Surgical History[3]     reports that he quit smoking about 57 years ago. His smoking use included cigarettes. He has been exposed to tobacco smoke. He has never used smokeless tobacco. He reports that he does not currently use alcohol. He reports that he does not currently use drugs.    Review of Systems  Review of Systems   Skin:  Positive for wound (left shin).   All other systems reviewed and are negative.                                 Objective    Vitals:    06/06/25 1658   BP: 164/78   BP Location: Left arm   Patient Position: Sitting   BP Cuff Size: Adult   Pulse: 67   Resp: 19   Temp: 36.5 °C (97.7 °F)   TempSrc: Oral   SpO2: 96%   Weight: 100 kg (221 lb)     No LMP for male patient.    Physical Exam  Vitals and nursing note reviewed.   Skin:     General: Skin is warm and dry.      Findings: Erythema and wound present.                Procedures    Point of Care Test & Imaging Results from this visit  No results found for this visit on 06/06/25.   Imaging  No results found.    Cardiology,  Vascular, and Other Imaging  No other imaging results found for the past 2 days      Diagnostic study results (if any) were reviewed by Crystal L Severino, APRN-CNP.    Assessment/Plan   Allergies, medications, history, and pertinent labs/EKGs/Imaging reviewed by Crystal L Severino, APRN-CNP.     Medical Decision Making  89 yo male with a h/o type 2 DM, HTN, A fib presents with wound to his left shin area.  He states he was moving some wood about 1 week ago when a piece fell striking him in the shin.  He states he has been using bacitracin and non stick bandages but the wound isn't healing.  I explain to him with DM it takes longer to heal and the wound looks to be red with swelling and it is time to start an antibiotic.  Patient will be started on doxycycline, per request and wound care instructions are given to patient and his wife, both verbalize understanding.  At time of discharge patient was clinically well-appearing and HDS for outpatient management. The patient and/or family was educated regarding diagnosis, supportive care, OTC and Rx medications. The patient and/or family was given the opportunity to ask questions prior to discharge.  They verbalized understanding of my discussion of the plans for treatment, expected course, indications to return to  or seek further evaluation in ED, and the need for timely follow up as directed.   They were provided with a work/school excuse if requested.      Orders and Diagnoses  Diagnoses and all orders for this visit:  Wound infection  -     doxycycline (Vibramycin) 100 mg capsule; Take 1 capsule (100 mg) by mouth 2 times a day for 7 days. Take with at least 8 ounces (large glass) of water, do not lie down for 30 minutes after      Medical Admin Record      Patient disposition: Home    Electronically signed by Crystal L Severino, APRN-CNP  5:22 PM           [1] (Not in a hospital admission)  [2]   Past Medical History:  Diagnosis Date    Atrial fibrillation,  unspecified type (Multi)     Diabetes (Multi)     type 2    Diabetic polyneuropathy associated with type 2 diabetes mellitus     Dyslipidemia     Elevated cholesterol     Essential (primary) hypertension     Hypertension     Macular degeneration     Mixed hyperlipidemia     Neuropathy     Primary hypertension     Retinopathy     Sleep apnea     Type 2 diabetes mellitus with diabetic polyneuropathy, with long-term current use of insulin    [3]   Past Surgical History:  Procedure Laterality Date    BACK SURGERY      HERNIA REPAIR  07/17/2013    Hernia Repair Inguinal Bilateral    KNEE SURGERY Left     OTHER SURGICAL HISTORY  07/17/2013    Cath Stent Placement Number Of Stents Placed:    OTHER SURGICAL HISTORY  07/17/2013    Lisseth Holes Without Other Surgery    ROTATOR CUFF REPAIR      TOTAL HIP ARTHROPLASTY Right 08/28/2014    Total Hip Replacement

## 2025-06-06 NOTE — PATIENT INSTRUCTIONS
Keep wound clean and dry  Antibiotic ointment and band-aid    If symptoms persist or worsen, follow up with your pcp

## 2025-06-07 ENCOUNTER — TELEPHONE (OUTPATIENT)
Dept: URGENT CARE | Facility: URGENT CARE | Age: 89
End: 2025-06-07

## 2025-06-30 ENCOUNTER — APPOINTMENT (OUTPATIENT)
Dept: PRIMARY CARE | Facility: CLINIC | Age: 89
End: 2025-06-30
Payer: MEDICARE

## 2025-08-06 ENCOUNTER — TELEPHONE (OUTPATIENT)
Facility: CLINIC | Age: 89
End: 2025-08-06
Payer: MEDICARE

## 2025-08-06 NOTE — TELEPHONE ENCOUNTER
Patient said he does not need to make any changes,he talked to Maulik and she told him his account was up to date with the [facility].

## 2025-08-11 ENCOUNTER — TELEPHONE (OUTPATIENT)
Facility: CLINIC | Age: 89
End: 2025-08-11
Payer: MEDICARE

## 2025-08-29 ENCOUNTER — TELEPHONE (OUTPATIENT)
Facility: CLINIC | Age: 89
End: 2025-08-29
Payer: MEDICARE

## 2025-10-07 ENCOUNTER — APPOINTMENT (OUTPATIENT)
Dept: PRIMARY CARE | Facility: CLINIC | Age: 89
End: 2025-10-07
Payer: MEDICARE